# Patient Record
Sex: FEMALE | Race: WHITE
[De-identification: names, ages, dates, MRNs, and addresses within clinical notes are randomized per-mention and may not be internally consistent; named-entity substitution may affect disease eponyms.]

---

## 2017-01-04 ENCOUNTER — HOSPITAL ENCOUNTER (OUTPATIENT)
Dept: HOSPITAL 75 - PREOP | Age: 37
Discharge: HOME | End: 2017-01-04
Attending: SURGERY
Payer: COMMERCIAL

## 2017-01-04 VITALS — BODY MASS INDEX: 21.66 KG/M2 | WEIGHT: 144.56 LBS | HEIGHT: 68.5 IN

## 2017-01-04 VITALS — DIASTOLIC BLOOD PRESSURE: 91 MMHG | SYSTOLIC BLOOD PRESSURE: 143 MMHG

## 2017-01-04 DIAGNOSIS — K82.8: ICD-10-CM

## 2017-01-04 DIAGNOSIS — Z11.2: ICD-10-CM

## 2017-01-04 DIAGNOSIS — Z01.812: Primary | ICD-10-CM

## 2017-01-04 PROCEDURE — 87081 CULTURE SCREEN ONLY: CPT

## 2017-01-04 PROCEDURE — 84703 CHORIONIC GONADOTROPIN ASSAY: CPT

## 2017-01-05 ENCOUNTER — HOSPITAL ENCOUNTER (OUTPATIENT)
Dept: HOSPITAL 75 - SDC | Age: 37
Discharge: HOME | End: 2017-01-05
Attending: SURGERY
Payer: COMMERCIAL

## 2017-01-05 VITALS — SYSTOLIC BLOOD PRESSURE: 137 MMHG | DIASTOLIC BLOOD PRESSURE: 88 MMHG

## 2017-01-05 VITALS — HEIGHT: 68.5 IN | WEIGHT: 144.56 LBS | BODY MASS INDEX: 21.66 KG/M2

## 2017-01-05 VITALS — SYSTOLIC BLOOD PRESSURE: 138 MMHG | DIASTOLIC BLOOD PRESSURE: 88 MMHG

## 2017-01-05 VITALS — SYSTOLIC BLOOD PRESSURE: 141 MMHG | DIASTOLIC BLOOD PRESSURE: 88 MMHG

## 2017-01-05 VITALS — SYSTOLIC BLOOD PRESSURE: 133 MMHG | DIASTOLIC BLOOD PRESSURE: 92 MMHG

## 2017-01-05 DIAGNOSIS — K81.1: Primary | ICD-10-CM

## 2017-01-05 RX ADMIN — MORPHINE SULFATE PRN MG: 10 INJECTION, SOLUTION INTRAMUSCULAR; INTRAVENOUS at 09:52

## 2017-01-05 RX ADMIN — SODIUM CHLORIDE, SODIUM LACTATE, POTASSIUM CHLORIDE, AND CALCIUM CHLORIDE PRN MLS/HR: 600; 310; 30; 20 INJECTION, SOLUTION INTRAVENOUS at 08:11

## 2017-01-05 RX ADMIN — SODIUM CHLORIDE, SODIUM LACTATE, POTASSIUM CHLORIDE, AND CALCIUM CHLORIDE PRN MLS/HR: 600; 310; 30; 20 INJECTION, SOLUTION INTRAVENOUS at 08:55

## 2017-01-05 RX ADMIN — MORPHINE SULFATE PRN MG: 10 INJECTION, SOLUTION INTRAMUSCULAR; INTRAVENOUS at 09:59

## 2017-01-05 RX ADMIN — SODIUM CHLORIDE, SODIUM LACTATE, POTASSIUM CHLORIDE, AND CALCIUM CHLORIDE PRN MLS/HR: 600; 310; 30; 20 INJECTION, SOLUTION INTRAVENOUS at 10:11

## 2017-01-05 NOTE — DISCHARGE INST-SURGICAL
D/C Lap Instructions-JAE


Follow Up Appt in 2 weeks





Activity as tolerated


No driving for 24 hours


No driving while on pain medications





Incentive Spirometry use every 2 hours while awake





High Fiber Diet 25g or more per day





Avoid Alcohol, Caffeine, Spicy Greasy and Acid foods.





Drink 64 fluid oz or more of fluids per day.





Symptoms to Report: Fever over 101 degree F, Nausea/Vomiting 


If any problems/questions: Contact your physician or go to Emergency Room








AP ROWE MD Jan 5, 2017 9:28 am

## 2017-01-05 NOTE — PROGRESS NOTE-PRE OPERATIVE
Pre-Operative Progress Note


H&P Reviewed


The H&P was reviewed, patient examined and no changes noted.


Date H&P Reviewed:  Jan 5, 2017


Time H&P Reviewed:  08:00


Pre-Operative Diagnosis:  Biliary Dyskinesia








FRED FREIRE Jan 5, 2017 8:06 am

## 2017-01-06 NOTE — OPERATIVE REPORT
PROCEDURE PHYSICIAN:   AP DARDEN

 

DATE OF PROCEDURE:  

01/05/2017

 

ATTENDING PRIMARY CLINICIAN: 

Elizabeth Benson. 

 

PREOPERATIVE DIAGNOSIS:

Symptomatic biliary dyskinesia. 

 

POSTOPERATIVE DIAGNOSIS:

Symptomatic biliary dyskinesia.

 

PROCEDURE:

Laparoscopic cholecystectomy. 

 

SURGEON:

Dr. Darden. 

 

ASSISTANT:

LEYDA Oropeza. 

 

ANESTHESIA:

General endotracheal. 

 

ESTIMATED BLOOD LOSS:

Minimal. 

 

FINDINGS:

Distended gallbladder. No gallstones. 

 

DISPOSITION:

The patient tolerated the procedure well. 

 

Ms. Courtney Grayson is a 36-year-old female who has had issues with

epigastric and right upper abdominal pain for several months on

an intermittent basis. She reports when the pain does occur she

has radiation towards the back as well as the right shoulder. She

then reports episodes of nausea which are usually worse after

eating greasy meals. She had a CT scan performed, which did not

show any gallstones. She then had a HIDA scan done on 12/21/2016

and had severe reproduction of symptoms upon administration of

Kinevac analog. This was consistent with a symptomatic biliary

dyskinesia. 

 

PROCEDURE:

The patient was brought to the operating room, laid supine on the

table. After adequate IV pain and sedative medications and

general endotracheal intubation the abdomen was prepped and

draped in standard surgical fashion. 

 

0.5% Marcaine with epinephrine was used to anesthetize the

overlying skin in the left upper abdominal quadrant. A small

transverse skin incision made using a 15 blade. An 0 silk suture

was applied to the medial aspect of the incision for retraction.

A Veress needle inserted with a low opening pressure of 0 mmHg

and the abdomen insufflated to 15 mmHg pressure. The Veress

needle removed and a 5 mm Xcel trocar placed followed by a 5 mm,

45 degrees angle laparoscope, visualizing the peritoneal cavity.

 

 

A four-quadrant abdominal exploration was performed. There was a

distended gallbladder, the liver, omentum and stomach appeared

normal. Under direct visualization we then proceeded to place an

infraumbilical 10 mm port after the skin and peritoneum were

anesthetized using 0.5% Marcaine with epinephrine. In a similar

fashion a right upper abdominal quadrant, 5 mm port was placed. 

 

The patient was then placed in reverse Trendelenburg position as

well as planed right side up, left side down. The fundus of the

gallbladder was then retracted anteriorly and superiorly. The

hepatoduodenal ligament was then opened bluntly with a hook

instrument as well as electrocautery. The critical view of safety

was then identified dissecting out the triangle of Calot,

identified the cystic duct and artery going into the gallbladder,

as well as the dissection of the inferior portion of the

gallbladder away from the liver.  Timeout was then taken and the

cystic duct and artery were then clipped proximally and distally

and cut with Endo Jyotsna. The gallbladder was then dissected off

the liver bed using electrocautery on the hook instrument with

visualization of good hemostasis, as well as no leaking ducts of

Luschka. The gallbladder was removed through the 10 mm port site

using an Endo Catch bag. 

 

The 10 mm port site, fascia and peritoneum were then closed under

direct vision using a Joaquin-Kar device and an 0 Vicryl

suture. The abdomen was desufflated and remaining ports removed.

All skin incisions were closed using 4-0 Monocryl running

subcuticular sutures. Wounds were then cleaned and covered with

Dermabond. 

 

The patient tolerated the procedure well. We will start IV and

oral pain medication as well as a clear liquid diet. Once she is

tolerating clears, has good pain control with oral pain

medication is ambulating well, we will discharge her home. We

will have her follow-up in the office in approximately 2 weeks. 

 

 

 

 

Job ID: [id number]

Dictated Date: 01/05/2017 09:34:24 

Transcription Date: 01/06/2017 13:14:38 / jessa

## 2017-03-03 ENCOUNTER — HOSPITAL ENCOUNTER (OUTPATIENT)
Dept: HOSPITAL 75 - RAD | Age: 37
End: 2017-03-03
Attending: SURGERY
Payer: COMMERCIAL

## 2017-03-03 DIAGNOSIS — K59.09: Primary | ICD-10-CM

## 2017-03-03 PROCEDURE — 74177 CT ABD & PELVIS W/CONTRAST: CPT

## 2017-03-03 RX ADMIN — IOHEXOL ONE ML: 350 INJECTION, SOLUTION INTRAVENOUS at 13:21

## 2017-03-03 RX ADMIN — KETOROLAC TROMETHAMINE ONE ML: 30 INJECTION, SOLUTION INTRAMUSCULAR; INTRAVENOUS at 13:21

## 2017-03-03 RX ADMIN — Medication PRN ML: at 13:21

## 2017-03-03 NOTE — DIAGNOSTIC IMAGING REPORT
PROCEDURE: CT abdomen and pelvis with contrast.



TECHNIQUE: Multiple contiguous axial images were obtained

through the abdomen and pelvis after administration of

intravenous contrast.



INDICATION: Right lower quadrant pain since cholecystectomy in

January 2017. Also with nausea.



CORRELATION STUDY: 12/01/2016.



FINDINGS: Lung bases are clear. EG junction is unremarkable.



Liver demonstrates likely mild diffuse fatty infiltration

however with more focal area of infiltration along the falciform

ligament. Spleen, pancreas, and adrenal glands are unremarkable.

Gallbladder is absent with clips in the fossa. No significant

bile duct dilatation demonstrated on CT imaging. No gallbladder

fossa fluid collection. Abdominal aorta is normal in contour.

Kidneys demonstrate a cyst of the anterior left kidney measuring

16 mm. Tiny nonobstructing stone in the interpolar region of the

right kidney. Otherwise, with normal enhancement. No

obstruction.



Gastrointestinal tract demonstrates no obstruction or

inflammation. What appears to be the appendix is located in the

retrocecal location and unremarkable. Mild severity of fecal

retention. Abdominal wall appearing unremarkable without

significant hernia defect.



Urinary bladder is unremarkable. Uterus is unremarkable.

Probable small follicles of both ovaries, right greater than

left. No significant free pelvic fluid.



IMPRESSION:

1. Post cholecystectomy changes. No evidence for post

cholecystectomy complications. No acute abnormality about the

abdomen and/or pelvis.

2. Mild severity of fecal retention without evidence for large

fecal impaction or bowel obstruction.



Findings have been telephoned to Dr. Darden's office at time of

this dictation.



Dictated by:



Dictated on workstation # [id number]

## 2017-03-07 ENCOUNTER — HOSPITAL ENCOUNTER (OUTPATIENT)
Dept: HOSPITAL 75 - PREOP | Age: 37
End: 2017-03-07
Attending: SURGERY
Payer: COMMERCIAL

## 2017-03-07 DIAGNOSIS — R11.2: ICD-10-CM

## 2017-03-07 DIAGNOSIS — Z01.818: Primary | ICD-10-CM

## 2017-03-08 ENCOUNTER — HOSPITAL ENCOUNTER (OUTPATIENT)
Dept: HOSPITAL 75 - ENDO | Age: 37
Discharge: HOME | End: 2017-03-08
Attending: SURGERY
Payer: COMMERCIAL

## 2017-03-08 VITALS — WEIGHT: 144.56 LBS | BODY MASS INDEX: 21.66 KG/M2 | HEIGHT: 68.5 IN

## 2017-03-08 VITALS — DIASTOLIC BLOOD PRESSURE: 75 MMHG | SYSTOLIC BLOOD PRESSURE: 110 MMHG

## 2017-03-08 VITALS — SYSTOLIC BLOOD PRESSURE: 94 MMHG | DIASTOLIC BLOOD PRESSURE: 56 MMHG

## 2017-03-08 VITALS — DIASTOLIC BLOOD PRESSURE: 56 MMHG | SYSTOLIC BLOOD PRESSURE: 96 MMHG

## 2017-03-08 VITALS — DIASTOLIC BLOOD PRESSURE: 56 MMHG | SYSTOLIC BLOOD PRESSURE: 94 MMHG

## 2017-03-08 DIAGNOSIS — K63.5: ICD-10-CM

## 2017-03-08 DIAGNOSIS — K29.60: ICD-10-CM

## 2017-03-08 DIAGNOSIS — K21.0: Primary | ICD-10-CM

## 2017-03-08 DIAGNOSIS — K44.9: ICD-10-CM

## 2017-03-08 PROCEDURE — 88305 TISSUE EXAM BY PATHOLOGIST: CPT

## 2017-03-08 PROCEDURE — 84703 CHORIONIC GONADOTROPIN ASSAY: CPT

## 2017-03-08 RX ADMIN — FENTANYL CITRATE PRN MCG: 50 INJECTION, SOLUTION INTRAMUSCULAR; INTRAVENOUS at 13:45

## 2017-03-08 RX ADMIN — FENTANYL CITRATE PRN MCG: 50 INJECTION, SOLUTION INTRAMUSCULAR; INTRAVENOUS at 13:05

## 2017-03-08 RX ADMIN — MIDAZOLAM HYDROCHLORIDE PRN MG: 1 INJECTION, SOLUTION INTRAMUSCULAR; INTRAVENOUS at 13:40

## 2017-03-08 RX ADMIN — MIDAZOLAM HYDROCHLORIDE PRN MG: 1 INJECTION, SOLUTION INTRAMUSCULAR; INTRAVENOUS at 12:55

## 2017-03-08 RX ADMIN — MIDAZOLAM HYDROCHLORIDE PRN MG: 1 INJECTION, SOLUTION INTRAMUSCULAR; INTRAVENOUS at 13:30

## 2017-03-08 RX ADMIN — MIDAZOLAM HYDROCHLORIDE PRN MG: 1 INJECTION, SOLUTION INTRAMUSCULAR; INTRAVENOUS at 13:15

## 2017-03-08 RX ADMIN — FENTANYL CITRATE PRN MCG: 50 INJECTION, SOLUTION INTRAMUSCULAR; INTRAVENOUS at 13:25

## 2017-03-08 RX ADMIN — FENTANYL CITRATE PRN MCG: 50 INJECTION, SOLUTION INTRAMUSCULAR; INTRAVENOUS at 12:52

## 2017-03-08 RX ADMIN — MIDAZOLAM HYDROCHLORIDE PRN MG: 1 INJECTION, SOLUTION INTRAMUSCULAR; INTRAVENOUS at 12:47

## 2017-03-08 RX ADMIN — FENTANYL CITRATE PRN MCG: 50 INJECTION, SOLUTION INTRAMUSCULAR; INTRAVENOUS at 13:55

## 2017-03-08 RX ADMIN — MIDAZOLAM HYDROCHLORIDE PRN MG: 1 INJECTION, SOLUTION INTRAMUSCULAR; INTRAVENOUS at 13:50

## 2017-03-08 RX ADMIN — FENTANYL CITRATE PRN MCG: 50 INJECTION, SOLUTION INTRAMUSCULAR; INTRAVENOUS at 14:00

## 2017-03-08 NOTE — PROGRESS NOTE-PRE OPERATIVE
Pre-Operative Progress Note


H&P Reviewed


The H&P was reviewed, patient examined and no changes noted.


Date H&P Reviewed:  Mar 8, 2017


Time H&P Reviewed:  11:20


Pre-Operative Diagnosis:  persistent abd pain, change bowel habits








AP ROWE MD Mar 8, 2017 11:30 am

## 2017-03-08 NOTE — CONSCIOUS SEDATION/ASA
Conscious Sedation Pre-Proced


Time Reviewed:  11:20


ASA Class:  2











Airway Mallampati Classification: ([place] appropriate class) I.  II.  III,  IV


 


Lungs 


 


Heart 


 


 ASA score


 


 ASA 1: a normal healthy patient


 


 ASA 2:  a patient with a mild systemic disease (mid diabetes, controlled 

hypertension, obesity 


 


 ASA 3:  a patient with a severe systemic disease that limits activity  (angina

, COPD, prior Myocardial infarction)


 


 ASA 4:  a patient with an incapacitating disease that is a constant threat to 

life (CHF, renal failure)


 


 ASA 5:  a moribund patient not expected to survive 24 hrs.  (ruptured aneurysm)


 


 ASA 6:  a declared brain dead patient whose organs are being harvested.


 


 For emergent operations, add the letter E after the classification








Grade 2


Sedation Plan:  Analgesia, Amnesia, Plan communicated to team members, 

Discussed options with patient/fam, Discussed risks with patient/fam


Note


The patient is an appropriate candidate to undergo the planned procedure, 

sedation, and anesthesia.





The patient immediately re-assessed prior to indication.








AP ROWE MD Mar 8, 2017 11:29 am

## 2017-03-08 NOTE — DISCHARGE INST-SURGICAL
D/C Lap Instructions-KIDO


New, Converted, or Re-Newed RX:  RX on Chart


Follow Up Appt in 2 weeks





Activity as tolerated





High Fiber Diet 25g or more per day





Avoid Alcohol, Caffeine, Spicy Greasy and Acid foods.





Drink 64 fluid oz or more of fluids per day.





Symptoms to Report: Fever over 101 degree F, Nausea/Vomiting 


If any problems/questions: Contact your physician or go to Emergency Room








AP ROWE MD Mar 8, 2017 2:35 pm

## 2017-03-08 NOTE — PROGRESS NOTE-POST OPERATIVE
Post-Operative Progess Note


Pre-Operative Diagnosis


persistent abd pain, change bowel habits





Post-Operative Diagnosis





reflux esophagitis(class B), small HH(2cm), mild-moderate gastritis.


mild sigmoid inflammation, splenic flexure polyp.





Post-Op Procedure Note


Date of Procedure:  Mar 8, 2017


Name of Procedure:  


EGD with bx.


Colonoscopy with bx.


Anesthesia Type


CS


Estimated blood loss (mL):  minimal


Specimen(s) collected


GE jxn, antrum








AP ROWE MD Mar 8, 2017 2:33 pm

## 2017-11-02 ENCOUNTER — HOSPITAL ENCOUNTER (EMERGENCY)
Dept: HOSPITAL 75 - ER | Age: 37
Discharge: HOME | End: 2017-11-02
Payer: COMMERCIAL

## 2017-11-02 VITALS — BODY MASS INDEX: 24.55 KG/M2 | HEIGHT: 68 IN | WEIGHT: 162 LBS

## 2017-11-02 VITALS — DIASTOLIC BLOOD PRESSURE: 95 MMHG | SYSTOLIC BLOOD PRESSURE: 171 MMHG

## 2017-11-02 DIAGNOSIS — F32.9: ICD-10-CM
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
## 2017-11-02 DIAGNOSIS — Z87.891: ICD-10-CM

## 2017-11-02 DIAGNOSIS — S16.1XXA: Primary | ICD-10-CM

## 2017-11-02 DIAGNOSIS — F41.9: ICD-10-CM

## 2017-11-02 DIAGNOSIS — Z87.448: ICD-10-CM

## 2017-11-02 PROCEDURE — 84703 CHORIONIC GONADOTROPIN ASSAY: CPT

## 2017-11-02 PROCEDURE — 70450 CT HEAD/BRAIN W/O DYE: CPT

## 2017-11-02 PROCEDURE — 99282 EMERGENCY DEPT VISIT SF MDM: CPT

## 2017-11-02 PROCEDURE — 72125 CT NECK SPINE W/O DYE: CPT

## 2017-11-02 NOTE — XMS REPORT
[facility]

 Created on: 2017



Courtney Grayson

External Reference #: [id number]

: 1980

Sex: Female



Demographics







 Address  [address] [place] [address]  [address]

 

 Preferred Language  Unknown

 

 Marital Status  Unknown

 

 Mosque Affiliation  Unknown

 

 Race  Unknown

 

 Ethnic Group  Unknown





Author







 Author  HUSSEIN ARIZA

 

 Organization  [facility]

 

 Address  [address]  [place]  [address]



 

 Phone  [phone]







Care Team Providers







 Care Team Member Name  Role  Phone

 

 HUSSEIN ARIZA  Unavailable  [phone]







PROBLEMS







 Type  Condition  ICD9-CM Code  ZKE49-WQ Code  Onset Dates  Condition Status  
SNOMED Code

 

 Problem  Environmental allergies     Z91.09     Active  137668623

 

 Problem  Dysuria     R30.0     Active  16609778

 

 Problem  Family planning, BCP (birth control pills) maintenance     Z30.41     
Active  215305557

 

 Problem  Anxiety     F41.9     Active  06056753

 

 Problem  Generalized abdominal pain     R10.84     Active  712705505

 

 Problem  HTN (hypertension)     I10     Active  17361708







ALLERGIES

Unknown Allergies



SOCIAL HISTORY

No smoking Hx information available



PLAN OF CARE





VITAL SIGNS





MEDICATIONS







 Medication  Instructions  Dosage  Frequency  Start Date  End Date  Duration  
Status

 

 Tramadol HCl 50 mg  Orally every 6 hrs  1 tablet as needed  6h  08 Dec, 2016  
19 Dec, 2016  7 days  Active







RESULTS

No Results



PROCEDURES

No Known procedures



IMMUNIZATIONS

No Known Immunizations

## 2017-11-02 NOTE — XMS REPORT
[facility]

 Created on: 2017



Courtney Grayson

External Reference #: [id number]

: 1980

Sex: Female



Demographics







 Address  [address] [place] [address]  [address]

 

 Preferred Language  Unknown

 

 Marital Status  Unknown

 

 Adventist Affiliation  Unknown

 

 Race  Unknown

 

 Ethnic Group  Unknown





Author







 Author  HUSSEIN ARIZA

 

 [facility]

 

 Address  [address]  [place]  [address]



 

 Phone  [phone]







Care Team Providers







 Care Team Member Name  Role  Phone

 

 HUSSEIN ARIZA  Unavailable  [phone]







PROBLEMS







 Type  Condition  ICD9-CM Code  CVH10-XQ Code  Onset Dates  Condition Status  
SNOMED Code

 

 Problem  HTN (hypertension)     I10     Active  93128312

 

 Problem  Family planning, BCP (birth control pills) maintenance     Z30.41     
Active  947843984

 

 Problem  Anxiety     F41.9     Active  00149193







ALLERGIES

No Known Allergies



SOCIAL HISTORY

No smoking Hx information available



PLAN OF CARE





VITAL SIGNS





MEDICATIONS

No Known Medications



RESULTS

No Results



PROCEDURES

No Known procedures



IMMUNIZATIONS

No Known Immunizations

## 2017-11-02 NOTE — XMS REPORT
[facility]

 Created on: 2016



[place]  Courtney

External Reference #: [id number]

: 1980

Sex: Female



Demographics







 Address  [address] [place]  [address]

 

 Home Phone  [phone]

 

 Preferred Language  Unknown

 

 Marital Status  Unknown

 

 Mormonism Affiliation  Unknown

 

 Race  White

 

 Ethnic Group  Not  or 





Author







 HUSSEIN Alvarenga

 

 [facility]  [employer]

 

 Address  Unknown

 

 Phone  Unavailable







Care Team Providers







 Care Team Member Name  Role  Phone

 

 HUSSEIN ARIZA  CP  Unavailable



                                                                



Allergies, Adverse Reactions, Alerts

          





 Substance  Reaction  Event Type

 

 Amoxicillin  hives  Drug Allergy



                                                                               
         



Problems

          





 Problem Type  Condition  Code  Onset Dates  Condition Status

 

 Problem  Family planning, BCP (birth control pills) maintenance  Z30.41     
Active

 

 Problem  Anxiety  F41.9     Active

 

 Problem  HTN (hypertension)  I10     Active

 

 Assessment  HTN (hypertension)  I10     Active

 

 Assessment  Anxiety  F41.9     Active



                                                                               
                                                 



Medications

          





 Medication  Code System  Code  Instructions  Start Date  End Date  Status  
Dosage

 

 Cetirizine HCl  NDC  [address]-73  10 mg Orally Once a day           1 tablet 
as needed

 

 TriNessa (28)  NDC  [address]-29  0.18/0.215/0.25 mg-35 mcg (28)    2013        take 1 tablet by oral route once daily

 

 Multivitamin  NDC  [address]16      Aug 22, 2014        1 tablet by Oral route 
1 time per day

 

 Lisinopril  NDC  59348018213  10 mg             1 TABLET ONCE A DAY ORALLY 30 
DAYS

 

 BusPIRone HCl  NDC  44871315101  5 MG Orally 2 times a day           1 tablet

 

 Celexa  NDC  [address]-82  20 mg Orally Once a day  Aug 29, 2016        1 
tablet



                                                                               
                                                           



Procedures

          





 Procedure  Coding System  Code  Date

 

 Office Visit, Est Pt., Level 3  CPT-4  57711  Aug 29, 2016



                                                                               
                   



Vital Signs

          





 Date/Time:  Aug 29, 2016

 

 Cardiac Monitoring Heart Rate  90 bpm

 

 Weight  152 lbs

 

 Height  68 in

 

 BMI  23.11 Index

 

 Blood Pressure Diastolic  90 mmHg

 

 Blood Pressure Systolic  128 mmHg



                                                                              



Results

          No Known Results                                                     
               



Summary Purpose

          [employer] Submission

## 2017-11-02 NOTE — XMS REPORT
[facility]

 Created on: 2017



Courtney Grayson

External Reference #: [id number]

: 1980

Sex: Female



Demographics







 Address  [address] [place] [address]  [address]

 

 Preferred Language  Unknown

 

 Marital Status  Unknown

 

 Nondenominational Affiliation  Unknown

 

 Race  Unknown

 

 Ethnic Group  Unknown





Author







 Author  HUSSEIN ARIZA

 

 Organization  [facility]

 

 Address  [address]  [place]  [address]



 

 Phone  [phone]







Care Team Providers







 Care Team Member Name  Role  Phone

 

 HUSSEIN ARIZA  Unavailable  [phone]







PROBLEMS







 Type  Condition  ICD9-CM Code  RFK66-BJ Code  Onset Dates  Condition Status  
SNOMED Code

 

 Problem  Environmental allergies     Z91.09     Active  307315816

 

 Problem  Dysuria     R30.0     Active  38138439

 

 Problem  Family planning, BCP (birth control pills) maintenance     Z30.41     
Active  051324313

 

 Problem  Anxiety     F41.9     Active  48509619

 

 Problem  Generalized abdominal pain     R10.84     Active  237620946

 

 Problem  HTN (hypertension)     I10     Active  17284439







ALLERGIES

Unknown Allergies



SOCIAL HISTORY

No smoking Hx information available



PLAN OF CARE





VITAL SIGNS





MEDICATIONS

Unknown Medications



RESULTS

No Results



PROCEDURES

No Known procedures



IMMUNIZATIONS

No Known Immunizations

## 2017-11-02 NOTE — XMS REPORT
[facility]

 Created on: 10/13/2015



Courtney Grayson

External Reference #: [id number]

: 1980

Sex: Female



Demographics







 Address  [address] [place]  [address]

 

 Home Phone  [phone]

 

 Preferred Language  Unknown

 

 Marital Status  Unknown

 

 Caodaism Affiliation  Unknown

 

 Race  White

 

 Ethnic Group  Not  or 





Author







 ZHANE Benitez

 

 [facility]  [employer]

 

 Address  Unknown

 

 Phone  Unavailable







Care Team Providers







 Care Team Member Name  Role  Phone

 

 ZHANE DAY  CP  Unavailable



                                                                



Allergies

          No Known Allergies                                                   
                                     



Problems

          





 Problem Type  Condition  Code  Onset Dates  Condition Status

 

 Problem  Cough  786.2     Active

 

 Problem  Screening examination for pulmonary tuberculosis  V74.1     Active

 

 Problem  Acute pharyngitis  462     Active

 

 Problem  Viral warts, unspecified  078.10     Active

 

 Problem  Headache  784.0     Active

 

 Problem  Pain of right thumb  729.5     Active

 

 Problem  Need for prophylactic vaccination and inoculation, Influenza  V04.81 
    Active

 

 Problem  Unspecified conjunctivitis  372.30     Active

 

 Problem  Unspecified hypertrophic and atrophic condition of skin  701.9     
Active

 

 Problem  Postnasal drip  784.91     Active

 

 Assessment  Encounter for immunization  Z23     Active

 

 Problem  Nausea alone  787.02     Active

 

 Problem  Diarrhea  787.91     Active



                                                                               
                                                                               
                                                  



Medications

          No Known Medications                                                 
                                       



Procedures

          





 Procedure  Coding System  Code  Date

 

 SINGLE IMMUNIZATION ADMIN  CPT-4  05627  Oct 13, 2015

 

 FLUARIX QUAD (3 & UP)-GSK-  CPT-4  19961  Oct 13, 2015



                                                                               
         



Results

          No Known Results                                                     
                                   



Immunizations

          





 Vaccine  Administration Date

 

 FLUARIX QUAD (3 & UP)-GSK-2015  Oct 13, 2015



                                                                    



Summary Purpose

          [employer] Submission

## 2017-11-02 NOTE — XMS REPORT
[facility]

 Created on: 2016



Courtney Grayson

External Reference #: [id number]

: 1980

Sex: Female



Demographics







 Address  [address] [place]  [address]

 

 Home Phone  [phone]

 

 Preferred Language  Unknown

 

 Marital Status  Unknown

 

 Lutheran Affiliation  Unknown

 

 Race  White

 

 Ethnic Group  Not  or 





Author







 HUSSEIN Alvarenga

 

 [facility]  [employer]

 

 Address  Unknown

 

 Phone  Unavailable







Care Team Providers







 Care Team Member Name  Role  Phone

 

 HUSSEIN ARIZA    Unavailable



                                                                



Allergies

          No Known Allergies                                                   
                                     



Problems

          





 Problem Type  Condition  Code  Onset Dates  Condition Status

 

 Problem  Family planning, BCP (birth control pills) maintenance  Z30.41     
Active

 

 Problem  Anxiety  F41.9     Active

 

 Problem  HTN (hypertension)  I10     Active



                                                                               
                             



Medications

          





 Medication  Code System  Code  Instructions  Start Date  End Date  Status  
Dosage

 

 Pyridium  NDC  [address]-81  200 MG Orally Three times a day       1 tablet after meals



                                                                              



Results

          No Known Results                                                     
               



Summary Purpose

          [employer] Submission

## 2017-11-02 NOTE — XMS REPORT
[facility]

 Created on: 2017



Chelsea  Courtney

External Reference #: [id number]

: 1980

Sex: Female



Demographics







 Address  [address] [place]  [address]

 

 Preferred Language  Unknown

 

 Marital Status  Unknown

 

 Amish Affiliation  Unknown

 

 Race  Unknown

 

 Ethnic Group  Unknown





Author







 Author  BRYCE HERNANDEZ

 

 Organization  [facility]

 

 Address  [address] [place]  [address]



 

 Phone  [phone]







Care Team Providers







 Care Team Member Name  Role  Phone

 

 BRYCE HERNANDEZ  Unavailable  [phone]







PROBLEMS







 Type  Condition  ICD9-CM Code  AOA42-SJ Code  Onset Dates  Condition Status  
SNOMED Code

 

 Problem  HTN (hypertension)     I10     Active  31174450

 

 Problem  Family planning, BCP (birth control pills) maintenance     Z30.41     
Active  065805504

 

 Assessment  Allergic rhinitis, unspecified allergic rhinitis trigger, 
unspecified rhinitis seasonality     J30.9  09 Sep, 2016  Active  80895380

 

 Problem  Anxiety     F41.9     Active  40895022

 

 Assessment  Acute non-recurrent maxillary sinusitis     J01.00  09 Sep, 2016  
Active  38483100







ALLERGIES







 Substance  Reaction  Event Type  Date  Status

 

 Amoxicillin  hives  Drug Allergy  09 Sep, 2016  Active







SOCIAL HISTORY

No smoking Hx information available



PLAN OF CARE





VITAL SIGNS







 Height  68 in  2016

 

 Weight  151.0 lbs  2016

 

 Heart Rate  78 bpm  2016

 

 Respiratory Rate  22   2016

 

 BMI  22.96 kg/m2  2016

 

 Blood pressure systolic  100 mmHg  2016

 

 Blood pressure diastolic  72 mmHg  2016







MEDICATIONS







 Medication  Instructions  Dosage  Frequency  Start Date  End Date  Duration  
Status

 

 TriNessa (28) 0.18/0.215/0.25 mg-35 mcg (28)     take 1 tablet by oral route 
once daily             Active

 

 Multivitamin     1 tablet by Oral route 1 time per day     22 Aug, 2014        
Active

 

 Fluticasone Propionate 50 MCG/ACT  Nasally Once a day  1-2 sprays in each 
nostril  24h  09 Sep, 2016     30 day(s)  Active

 

 Cetirizine HCl 10 mg  Orally Once a day  1 tablet as needed  24h           
Active

 

 Azithromycin 250 MG  Orally Once a day  2 tablets  on the first day, then 1 
tablet daily for 4 days  24h  09 Sep, 2016  14 Sep, 2016  5 day(s)  Active

 

 Celexa 20 mg  Orally Once a day  1 tablet  24h  29 Aug, 2016     30 day(s)  
Active

 

 Lisinopril 10 mg     1 TABLET ONCE A DAY ORALLY 30 DAYS           30  Active







RESULTS

No Results



PROCEDURES







 Procedure  Date Ordered  Related Diagnosis  Body Site

 

 Office Visit, Est Pt., Level 3  2016      







IMMUNIZATIONS

No Known Immunizations

## 2017-11-02 NOTE — XMS REPORT
[facility]

 Created on: 10/14/2016



[place]  Courtney

External Reference #: [id number]

: 1980

Sex: Female



Demographics







 Address  [address] [place]  [address]

 

 Home Phone  [phone]

 

 Preferred Language  Unknown

 

 Marital Status  Unknown

 

 Worship Affiliation  Unknown

 

 Race  White

 

 Ethnic Group  Not  or 





Author







 HUSSEIN Alvarenga

 

 Organization  [employer]

 

 Address  Unknown

 

 Phone  Unavailable







Care Team Providers







 Care Team Member Name  Role  Phone

 

 HUSSEIN ARIZA  CP  Unavailable



                                                                



Allergies, Adverse Reactions, Alerts

          





 Substance  Reaction  Event Type

 

 Amoxicillin  hives  Drug Allergy



                                                                               
         



Problems

          





 Problem Type  Condition  Code  Onset Dates  Condition Status

 

 Problem  Family planning, BCP (birth control pills) maintenance  Z30.41     
Active

 

 Problem  Anxiety  F41.9     Active

 

 Problem  HTN (hypertension)  I10     Active

 

 Assessment  Anxiety  F41.9     Active

 

 Assessment  HTN (hypertension)  I10     Active



                                                                               
                                                 



Medications

          





 Medication  Code System  Code  Instructions  Start Date  End Date  Status  
Dosage

 

 Cetirizine HCl  NDC  [address]-76  10 mg Orally Once a day           1 tablet 
as needed

 

 Celexa  NDC  [address]-18  20 mg Orally Once a day           1 tablet

 

 Multivitamin  NDC  99299-94545      Aug 22, 2014        1 tablet by Oral route 
1 time per day

 

 TriNessa (28)  NDC  [address]-08  0.18/0.215/0.25 mg-35 mcg (28)    2013        take 1 tablet by oral route once daily

 

 Lisinopril  NDC  09189879834  10 mg             1 TABLET ONCE A DAY ORALLY 30 
DAYS

 

 Fluticasone Propionate  NDC  [address]-54  50 MCG/ACT Nasally Once a day  
2016        1-2 sprays in each nostril



                                                                               
                                                           



Procedures

          





 Procedure  Coding System  Code  Date

 

 Office Visit, Est Pt., Level 3  CPT-4  50997  Oct 13, 2016



                                                                               
                   



Vital Signs

          





 Date/Time:  Oct 13, 2016

 

 Cardiac Monitoring Heart Rate  88 bpm

 

 Weight  149 lbs

 

 Height  68 in

 

 BMI  22.65 Index

 

 Blood Pressure Diastolic  68 mmHg

 

 Blood Pressure Systolic  102 mmHg



                                                                              



Results

          No Known Results                                                     
               



Summary Purpose

          [employer] Submission

## 2017-11-02 NOTE — XMS REPORT
[facility]

 Created on: 2017



[place]  Courtney

External Reference #: [id number]

: 1980

Sex: Female



Demographics







 Address  [address] [place] [address]  [address]

 

 Preferred Language  Unknown

 

 Marital Status  Unknown

 

 Pentecostal Affiliation  Unknown

 

 Race  Unknown

 

 Ethnic Group  Unknown





Author







 Author  HUSSEIN ARIZA

 

 Organization  [facility]

 

 Address  [address]  [place]  [address]



 

 Phone  [phone]







Care Team Providers







 Care Team Member Name  Role  Phone

 

 HUSSEIN ARIZA  Unavailable  [phone]







PROBLEMS







 Type  Condition  ICD9-CM Code  MXC69-IC Code  Onset Dates  Condition Status  
SNOMED Code

 

 Problem  Environmental allergies     Z91.09     Active  157957330

 

 Problem  Generalized abdominal pain     R10.84     Active  111323528

 

 Problem  HTN (hypertension)     I10     Active  22475942

 

 Problem  Family planning, BCP (birth control pills) maintenance     Z30.41     
Active  565055299

 

 Problem  Dysuria     R30.0     Active  79030802

 

 Problem  Anxiety     F41.9     Active  24637837







ALLERGIES

Unknown Allergies



SOCIAL HISTORY

No smoking Hx information available



PLAN OF CARE





VITAL SIGNS





MEDICATIONS

Unknown Medications



RESULTS







 Name  Result  Date  Reference Range

 

 UA W/CULTURE IF INDICATED (IN HOUSE)         

 

 Lot #         

 

 Exp date         

 

 Clarity  turbid      

 

 Color  yellow      

 

 Odor  foul      

 

 GLU  neg      

 

 CHAIM  neg      

 

 KET  neg      

 

 SG  1.020      

 

 BLO  neg      

 

 pH  7,0      

 

 Protein  neg      

 

 URO  neg      

 

 NIT  neg      

 

 XENIA  +1      

 

 Lot #         

 

 Exp date         

 

 CULTURE, URINE     2016   

 

 Urine Culture, Routine  Final report      

 

 Result 1  No growth      







PROCEDURES







 Procedure  Date Ordered  Related Diagnosis  Body Site

 

 URINALYSIS, AUTO, W/O SCOPE  2016      

 

 URINE CULTURE/COLONY COUNT  2016      

 

 THER/PROPH/DIAG INJ, SC/IM  2016      

 

 ROCEPHIN 1 GM (IM)  2016      







IMMUNIZATIONS







 Vaccine  Route  Administration Date  Status

 

 ROCEPHIN 1 GM (IM)  IM Intramuscular  2016  Administered

## 2017-11-02 NOTE — XMS REPORT
[facility]

 Created on: 2016



Courtney Grayson

External Reference #: [id number]

: 1980

Sex: Female



Demographics







 Address  [address] [place]  [address]

 

 Home Phone  [phone]

 

 Preferred Language  Unknown

 

 Marital Status  Unknown

 

 Scientologist Affiliation  Unknown

 

 Race  White

 

 Ethnic Group  Not  or 





Author







 HUSSEIN Alvarenga

 

 Organization  [employer]

 

 Address  Unknown

 

 Phone  Unavailable







Care Team Providers







 Care Team Member Name  Role  Phone

 

 HUSSEIN ARIZA  CP  Unavailable



                                                                



Allergies

          No Known Allergies                                                   
                                     



Problems

          





 Problem Type  Condition  Code  Onset Dates  Condition Status

 

 Problem  Family planning, BCP (birth control pills) maintenance  Z30.41     
Active

 

 Problem  Anxiety  F41.9     Active

 

 Problem  HTN (hypertension)  I10     Active



                                                                               
                             



Medications

          





 Medication  Code System  Code  Instructions  Start Date  End Date  Status  
Dosage

 

 Tramadol HCl  NDC  [address]-47  50 mg Orally every 6 hrs  2016      
  1 tablet as needed



                                                                              



Results

          No Known Results                                                     
               



Summary Purpose

          [employer] Submission

## 2017-11-02 NOTE — XMS REPORT
[facility]

 Created on: 2016



Courtney Grayson

External Reference #: [id number]

: 1980

Sex: Female



Demographics







 Address  [address] [place]  [address]

 

 Home Phone  [phone]

 

 Preferred Language  Unknown

 

 Marital Status  Unknown

 

 Cheondoism Affiliation  Unknown

 

 Race  White

 

 Ethnic Group  Not  or 





Author







 HUSSEIN Alvarenga

 

 Organization  [employer]

 

 Address  Unknown

 

 Phone  Unavailable







Care Team Providers







 Care Team Member Name  Role  Phone

 

 HUSSEIN ARIZA  CP  Unavailable



                                                                



Allergies

          No Known Allergies                                                   
                                     



Problems

          





 Problem Type  Condition  Code  Onset Dates  Condition Status

 

 Problem  Family planning, BCP (birth control pills) maintenance  Z30.41     
Active

 

 Problem  Anxiety  F41.9     Active

 

 Problem  HTN (hypertension)  I10     Active



                                                                               
                             



Medications

          No Known Medications                                                 
                             



Results

          No Known Results                                                     
               



Summary Purpose

          [employer] Submission

## 2017-11-02 NOTE — XMS REPORT
[facility]

 Created on: 2016



Courtney Grayson

External Reference #: [id number]

: 1980

Sex: Female



Demographics







 Address  [address] [place]  [address]

 

 Home Phone  [phone]

 

 Preferred Language  Unknown

 

 Marital Status  Unknown

 

 Mu-ism Affiliation  Unknown

 

 Race  White

 

 Ethnic Group  Not  or 





Author







 HUSSEIN Alvarenga

 

 Organization  [employer]

 

 Address  Unknown

 

 Phone  Unavailable







Care Team Providers







 Care Team Member Name  Role  Phone

 

 HUSSEIN ARIZA  CP  Unavailable



                                                                



Allergies

          No Known Allergies                                                   
                                     



Problems

          





 Problem Type  Condition  Code  Onset Dates  Condition Status

 

 Problem  Family planning, BCP (birth control pills) maintenance  Z30.41     
Active

 

 Problem  Anxiety  F41.9     Active

 

 Problem  HTN (hypertension)  I10     Active



                                                                               
                             



Medications

          No Known Medications                                                 
                             



Results

          No Known Results                                                     
               



Summary Purpose

          [employer] Submission

## 2017-11-02 NOTE — XMS REPORT
[facility]

 Created on: 2016



Courtney Grayson

External Reference #: [id number]

: 1980

Sex: Female



Demographics







 Address  [address] [place]  [address]

 

 Home Phone  [phone]

 

 Preferred Language  Unknown

 

 Marital Status  Unknown

 

 Protestant Affiliation  Unknown

 

 Race  White

 

 Ethnic Group  Not  or 





Author







 HUSSEIN Alvarenga

 

 [facility]  [employer]

 

 Address  Unknown

 

 Phone  Unavailable







Care Team Providers







 Care Team Member Name  Role  Phone

 

 HUSSEIN ARIZA    Unavailable



                                                                



Allergies

          No Known Allergies                                                   
                                     



Problems

          





 Problem Type  Condition  Code  Onset Dates  Condition Status

 

 Problem  Family planning, BCP (birth control pills) maintenance  Z30.41     
Active

 

 Problem  Anxiety  F41.9     Active

 

 Problem  HTN (hypertension)  I10     Active



                                                                               
                             



Medications

          





 Medication  Code System  Code  Instructions  Start Date  End Date  Status  
Dosage

 

 Flagyl  NDC  [address]-74  500 MG Orally 2 times a day       1 tablet



                                                                              



Results

          No Known Results                                                     
               



Summary Purpose

          [employer] Submission

## 2017-11-02 NOTE — XMS REPORT
[facility]

 Created on: 2017



Courtney Grayson

External Reference #: [id number]

: 1980

Sex: Female



Demographics







 Address  [address] [place] [address]  [address]

 

 Preferred Language  Unknown

 

 Marital Status  Unknown

 

 Christianity Affiliation  Unknown

 

 Race  Unknown

 

 Ethnic Group  Unknown





Author







 Author  HUSSEIN ARIZA

 

 Organization  [facility]

 

 Address  [address]  [place]  [address]



 

 Phone  [phone]







Care Team Providers







 Care Team Member Name  Role  Phone

 

 NIKOLAI ARIZAE  Unavailable  [phone]







PROBLEMS







 Type  Condition  ICD9-CM Code  SYQ39-YS Code  Onset Dates  Condition Status  
SNOMED Code

 

 Problem  HTN (hypertension)     I10     Active  86873787

 

 Problem  Family planning, BCP (birth control pills) maintenance     Z30.41     
Active  686724546

 

 Problem  Anxiety     F41.9     Active  45220097

 

 Assessment  Dysuria     R30.0  01 Dec, 2016  Active  13699187







ALLERGIES







 Substance  Reaction  Event Type  Date  Status

 

 Amoxicillin  hives  Drug Allergy  01 Dec, 2016  Active







SOCIAL HISTORY

No smoking Hx information available



PLAN OF CARE





VITAL SIGNS







 Height  68 in  2016

 

 Weight  145.1 lbs  2016

 

 Heart Rate  92 bpm  2016

 

 Respiratory Rate  20   2016

 

 BMI  22.06 kg/m2  2016

 

 Blood pressure systolic  125 mmHg  2016

 

 Blood pressure diastolic  85 mmHg  2016







MEDICATIONS







 Medication  Instructions  Dosage  Frequency  Start Date  End Date  Duration  
Status

 

 Lisinopril 10 mg     1 TABLET ONCE A DAY ORALLY 30 DAYS           30  Active

 

 Celexa 20 mg  Orally Once a day  1 tablet  24h        30  Active

 

 Multivitamin     1 tablet by Oral route 1 time per day     22 Aug, 2014        
Active

 

 Cetirizine HCl 10 mg  Orally Once a day  1 tablet as needed  24h           
Active

 

 Fluticasone Propionate 50 MCG/ACT  Nasally Once a day  1-2 sprays in each 
nostril  24h  09 Sep, 2016     30 day(s)  Active

 

 Tramadol HCl 50 mg  Orally every 6 hrs  1 tablet as needed  6h    
      Active

 

 TriNessa (28) 0.18/0.215/0.25 mg-35 mcg (28)     take 1 tablet by oral route 
once daily             Active







RESULTS







 Name  Result  Date  Reference Range

 

 UA LONG DIP (IN HOUSE)     2016   

 

 Lot #  [id number]      

 

 Exp date        

 

 Clarity  Slightly Cloudy      

 

 Color  Yellow      

 

 Odor  Yes      

 

 GLU  Negative      

 

 CHAIM  Negative      

 

 KET  Negative      

 

 SG  >=1.030      

 

 BLO  Negative      

 

 pH  6.0      

 

 Protein  Negative      

 

 URO  0.2      

 

 NIT  Negative      

 

 XENIA  Negative      

 

 Lot #  [id number]      

 

 Exp date        







PROCEDURES







 Procedure  Date Ordered  Related Diagnosis  Body Site

 

 URINALYSIS, AUTO, W/O SCOPE  Dec 01, 2016      

 

 Office Visit, Est Pt., Level 3  Dec 01, 2016      







IMMUNIZATIONS

No Known Immunizations

## 2017-11-02 NOTE — XMS REPORT
[facility]

 Created on: 2016



Courtney Grayson

External Reference #: [id number]

: 1980

Sex: Female



Demographics







 Address  [address] [place]  [address]

 

 Home Phone  [phone]

 

 Preferred Language  Unknown

 

 Marital Status  Unknown

 

 Worship Affiliation  Unknown

 

 Race  White

 

 Ethnic Group  Not  or 





Author







 HUSSEIN Alvarenga

 

 [facility]  [employer]

 

 Address  Unknown

 

 Phone  Unavailable







Care Team Providers







 Care Team Member Name  Role  Phone

 

 HUSSEIN ARIZA  CP  Unavailable



                                                                



Allergies, Adverse Reactions, Alerts

          





 Substance  Reaction  Event Type

 

 Amoxicillin  hives  Drug Allergy



                                                                               
         



Problems

          





 Problem Type  Condition  Code  Onset Dates  Condition Status

 

 Problem  Family planning, BCP (birth control pills) maintenance  Z30.41     
Active

 

 Problem  Anxiety  F41.9     Active

 

 Problem  HTN (hypertension)  I10     Active

 

 Assessment  Pelvic pain  R10.2     Active

 

 Assessment  Candidal skin infection  B37.2     Active



                                                                               
                                                 



Medications

          





 Medication  Code System  Code  Instructions  Start Date  End Date  Status  
Dosage

 

 Diflucan  NDC  [address]-40  100 MG Orally Once a day  2016  Dec 02, 
2016     1 tablet

 

 TriNessa (28)  NDC  [address]-69  0.18/0.215/0.25 mg-35 mcg (28)    2013        take 1 tablet by oral route once daily

 

 Celexa  NDC  [address]-11  20 mg Orally Once a day           1 tablet

 

 Lisinopril  NDC  83490398413  10 mg             1 TABLET ONCE A DAY ORALLY 30 
DAYS

 

 Multivitamin  NDC  36044-89672      Aug 22, 2014        1 tablet by Oral route 
1 time per day

 

 Cetirizine HCl  NDC  [address]-25  10 mg Orally Once a day           1 tablet 
as needed

 

 Tramadol HCl  NDC  [address]-12  50 mg Orally every 6 hrs  2016      
  1 tablet as needed

 

 Fluticasone Propionate  NDC  [address]-15  50 MCG/ACT Nasally Once a day  
2016        1-2 sprays in each nostril



                                                                               
                                                                               



Procedures

          





 Procedure  Coding System  Code  Date

 

 URINE PREGNANCY TEST  CPT-4  08962  2016

 

 No Charge  CPT-4  20104  2016

 

 URINALYSIS, AUTO, W/O SCOPE  CPT-4  91468  2016

 

 GARCIA VAG, DNA, DIR PROBE  CPT-4  94543  2016

 

 TRICHOMONAS ASSAY W/OPTIC  CPT-4  51684  2016

 

 Office Visit, Est Pt., Level 3  CPT-4  12975  2016

 

 CULTURE, BACTERIA, OTHER  CPT-4  22659  2016



                                                                               
                                                                               



Vital Signs

          





 Date/Time:  2016

 

 Cardiac Monitoring Heart Rate  84 bpm

 

 Weight  147.4 lbs

 

 Height  68 in

 

 BMI  22.41 Index

 

 Blood Pressure Diastolic  78 mmHg

 

 Blood Pressure Systolic  124 mmHg



                                                                    



Results

          





 Name  Result  Date  Reference Range  Unit  Abnormality Flag

 

 UA LONG DIP (IN HOUSE)               

 

 ----URO  0.2  2016         

 

 ----Protein  negative  2016         

 

 ----Odor  none  2016         

 

 ----GLU  negative  2016         

 

 ----CHAIM  negative  2016         

 

 ----KET  negative  2016         

 

 ----SG  1.025  2016         

 

 ----BLO  negative  2016         

 

 ----Clarity  slightly cloudy  2016         

 

 ----pH  6.0  2016         

 

 ----XENIA  negative  2016         

 

 ----Color  yellow  2016         

 

 ----NIT  negative  2016         

 

 BACTERIAL VAGINOSIS (IN HOUSE)               

 

 ----Exp date  2016         

 

 ----RESULTS  negative  2016         

 

 ----Lot #  [id number]  2016         

 

 ----Control  +  2016         

 

 PREGNANCY TEST, URINE (IN HOUSE)               

 

 ----Lot #  [id number]  2016         

 

 ----Control  +  2016         

 

 ----Exp date  2016         

 

 TRICHOMONAS (IN HOUSE)               

 

 ----Exp date  2016         

 

 ----Control  +  2016         

 

 ----Lot #  [id number]  2016         

 

 ----TRICHOMONAS  negative  2016         

 

 Ultrasound : Pelvic, COMPLETE (REFLEX CPT-05095)               



                                                                               
                             



Summary Purpose

          [employer]ClinicalWorks Submission

## 2017-11-02 NOTE — XMS REPORT
[facility]

 Created on: 2017



[place]ney juarez

External Reference #: [id number]

: 1980

Sex: Female



Demographics







 Address  [address] [place]  [address]

 

 Preferred Language  Unknown

 

 Marital Status  Unknown

 

 Orthodox Affiliation  Unknown

 

 Race  Unknown

 

 Ethnic Group  Unknown





Author







 Author  BRYCE HERNANDEZ

 

 Organization  [facility] [place] [facility]

 

 Address  [address] [place]  [address]



 

 Phone  [phone]







Care Team Providers







 Care Team Member Name  Role  Phone

 

 BRYCE HERNANDEZ  Unavailable  [phone]







PROBLEMS







 Type  Condition  ICD9-CM Code  ETE65-DF Code  Onset Dates  Condition Status  
SNOMED Code

 

 Problem  HTN (hypertension)     I10     Active  81681670

 

 Problem  Family planning, BCP (birth control pills) maintenance     Z30.41     
Active  286279252

 

 Assessment  Lower abdominal pain     R10.30  08 Dec, 2016  Active  12869068

 

 Problem  Anxiety     F41.9     Active  52297539

 

 Assessment  Dysuria     R30.0  08 Dec, 2016  Active  70670220







ALLERGIES







 Substance  Reaction  Event Type  Date  Status

 

 Amoxicillin  hives  Drug Allergy  08 Dec, 2016  Active







SOCIAL HISTORY

No smoking Hx information available



PLAN OF CARE





VITAL SIGNS







 Height  68 in  2016

 

 Weight  144.2 lbs  2016

 

 Heart Rate  88 bpm  2016

 

 Respiratory Rate  20   2016

 

 BMI  21.92 kg/m2  2016

 

 Blood pressure systolic  126 mmHg  2016

 

 Blood pressure diastolic  90 mmHg  2016







MEDICATIONS







 Medication  Instructions  Dosage  Frequency  Start Date  End Date  Duration  
Status

 

 Cetirizine HCl 10 mg  Orally Once a day  1 tablet as needed  24h           
Active

 

 TriNessa (28) 0.18/0.215/0.25 mg-35 mcg (28)     take 1 tablet by oral route 
once daily             Active

 

 Multivitamin     1 tablet by Oral route 1 time per day     22 Aug, 2014        
Active

 

 Naproxen 500 MG  Orally every 12 hrs  1 tablet as needed  12h  08 Dec, 2016  
15 Dec, 2016  7 days  Active

 

 Celexa 20 mg  Orally Once a day  1 tablet  24h        30  Active

 

 Lisinopril 10 mg     1 TABLET ONCE A DAY ORALLY 30 DAYS           30  Active

 

 Zofran 4 MG  Orally every 8 hours  1 tablet  8h  08 Dec, 2016     7 days  
Active

 

 Tramadol HCl 50 MG  Orally every 6 hrs  1 tablet as needed  6h  08 Dec, 2016  
15 Dec, 2016  7 days  Active







RESULTS







 Name  Result  Date  Reference Range

 

 CBC     2016   

 

 WBC  7.3     3.4-10.8

 

 RBC  4.59     3.77-5.28

 

 Hemoglobin  13.3     11.1-15.9

 

 Hematocrit  39.8     34.0-46.6

 

 MCV  87     79-97

 

 MCH  29.0     26.6-33.0

 

 MCHC  33.4     31.5-35.7

 

 RDW  12.4     12.3-15.4

 

 Platelets  389     150-379

 

 Neutrophils  75      

 

 Lymphs  18      

 

 Monocytes  5      

 

 Eos  1      

 

 Basos  1      

 

 Neutrophils (Absolute)  5.6     1.4-7.0

 

 Lymphs (Absolute)  1.3     0.7-3.1

 

 Monocytes(Absolute)  0.4     0.1-0.9

 

 Eos (Absolute)  0.1     0.0-0.4

 

 Baso (Absolute)  0.0     0.0-0.2

 

 Immature Granulocytes  0      

 

 Immature Grans (Abs)  0.0     0.0-0.1

 

 CMP     2016   

 

 Glucose, Serum  89     65-99

 

 BUN  11     6-20

 

 Creatinine, Serum  0.76     0.57-1.00

 

 eGFR If NonAfricn Am  103         >59

 

 eGFR If Africn Am  118         >59

 

 BUN/Creatinine Ratio  14     8-20

 

 Sodium, Serum  141     136-144

 

 Potassium, Serum  4.4     3.5-5.2

 

 Chloride, Serum  102     

 

 Carbon Dioxide, Total  25     18-29

 

 Calcium, Serum  9.7     8.7-10.2

 

 Protein, Total, Serum  7.2     6.0-8.5

 

 Albumin, Serum  4.6     3.5-5.5

 

 Globulin, Total  2.6     1.5-4.5

 

 A/G Ratio  1.8     1.1-2.5

 

 Bilirubin, Total  0.3     0.0-1.2

 

 Alkaline Phosphatase, S  79     

 

 AST (SGOT)  21     0-40

 

 ALT (SGPT)  20     0-32

 

 UA LONG DIP (IN HOUSE)     2016   

 

 Lot #  [id number]      

 

 Exp date  2018      

 

 Clarity  clear      

 

 Color  dark yellow      

 

 Odor  none      

 

 GLU  negative      

 

 CHAIM  negative      

 

 KET  negative      

 

 SG  >-1.030      

 

 BLO  2+      

 

 pH  6.5      

 

 Protein  trace      

 

 URO  0.2      

 

 NIT  negative      

 

 XENIA  negative      

 

 Lot #  [id number]      

 

 Exp date  2018      

 

 CULTURE, URINE     2016   

 

 Urine Culture, Routine  Final report      

 

 Result 1  No growth      







PROCEDURES







 Procedure  Date Ordered  Related Diagnosis  Body Site

 

 URINALYSIS, AUTO, W/O SCOPE  Dec 08, 2016      

 

 Office Visit, Est Pt., Level 3  Dec 08, 2016      

 

 COMPREHEN METABOLIC PANEL  Dec 08, 2016      

 

 COMPLETE CBC W/AUTO DIFF WBC  Dec 08, 2016      

 

 URINE CULTURE/COLONY COUNT  Dec 08, 2016      

 

 VENIPUNCT, ROUTINE*  Dec 08, 2016      







IMMUNIZATIONS

No Known Immunizations

## 2017-11-02 NOTE — XMS REPORT
[facility]

 Created on: 2017



LoveladyCourtney juarez

External Reference #: [id number]

: 1980

Sex: Female



Demographics







 Address  [address] [place] [address]  [address]

 

 Preferred Language  Unknown

 

 Marital Status  Unknown

 

 Yarsanism Affiliation  Unknown

 

 Race  Unknown

 

 Ethnic Group  Unknown





Author







 Author  HUSSEIN ARIZA

 

 Organization  [facility]

 

 Address  [address]  [place]  [address]



 

 Phone  [phone]







Care Team Providers







 Care Team Member Name  Role  Phone

 

 TO  HUSSEIN  Unavailable  [phone]







PROBLEMS







 Type  Condition  ICD9-CM Code  WAM40-LB Code  Onset Dates  Condition Status  
SNOMED Code

 

 Problem  Environmental allergies     Z91.09     Active  666902962

 

 Problem  Generalized abdominal pain     R10.84     Active  322839036

 

 Problem  HTN (hypertension)     I10     Active  19023669

 

 Problem  Family planning, BCP (birth control pills) maintenance     Z30.41     
Active  786158721

 

 Problem  Dysuria     R30.0     Active  57554885

 

 Problem  Anxiety     F41.9     Active  54367529







ALLERGIES







 Substance  Reaction  Event Type  Date  Status

 

 Amoxicillin  hives  Drug Allergy  02 Mar, 2017  Active







SOCIAL HISTORY

Never Assessed



PLAN OF CARE







 Activity  Details









  









 Follow Up  3 Months Reason:htn







VITAL SIGNS







 Height  68 in  2017

 

 Weight  149.6 lbs  2017

 

 Temperature  98.1 degrees Fahrenheit  2017

 

 Heart Rate  80 bpm  2017

 

 Respiratory Rate  20   2017

 

 BMI  22.74 kg/m2  2017

 

 Blood pressure systolic  114 mmHg  2017

 

 Blood pressure diastolic  70 mmHg  2017







MEDICATIONS







 Medication  Instructions  Dosage  Frequency  Start Date  End Date  Duration  
Status

 

 Cetirizine HCl 10 mg     1 TABLET AS NEEDED ONCE A DAY ORALLY              
Active

 

 Zofran 4 MG  Orally every 8 hours  1 tablet  8h  08 Dec, 2016        Active

 

 TriNessa (28) 0.18/0.215/0.25 mg-35 mcg (28)     take 1 tablet by oral route 
once daily             Active

 

 Lisinopril 10 mg  Orally Once a day  1 TABLET ONCE A DAY ORALLY 30 DAYS  24h  
      30  Active

 

 Celexa 20 mg     1 TABLET ONCE A DAY ORALLY 30              Active

 

 Carafate 1 GM  Orally Twice a day  1 tablet on an empty stomach  12h           
Active

 

 Pantoprazole Sodium 40 MG  Orally Once a day  1 tablet  24h  02 Mar, 2017     
   Active

 

 Multivitamin     1 tablet by Oral route 1 time per day     22 Aug, 2014        
Active

 

 Hydrocodone-Acetaminophen 7.5-325 MG  Orally every 6 hrs  1 tablet as needed  
6h           Active







RESULTS







 Name  Result  Date  Reference Range

 

 UA LONG DIP (IN HOUSE)     2017   

 

 Lot #  [id number]      

 

 Exp date  2018      

 

 Clarity  clear      

 

 Color  yellow      

 

 Odor  none      

 

 GLU  negative      

 

 CHAIM  negative      

 

 KET  negative      

 

 SG  1.020      

 

 BLO  3+      

 

 pH  7.0      

 

 Protein  negative      

 

 URO  0.2      

 

 NIT  negative      

 

 XENIA  negative      

 

 Lot #         

 

 Exp date         







PROCEDURES







 Procedure  Date Ordered  Result  Body Site

 

 URINALYSIS, AUTO, W/O SCOPE  2017      







IMMUNIZATIONS

No Known Immunizations



MEDICAL (GENERAL) HISTORY







 Type  Description  Date

 

 Medical History  Anxiety   

 

 Medical History  hypertension   

 

 Medical History  gastroenteritis   

 

 Surgical History  wisdom teeth extraction   

 

 Surgical History  cholecsytectomy  2017

 

 Surgical History  Colonoscopy, EGD  2017

 

 Hospitalization History  child birth

## 2017-11-02 NOTE — XMS REPORT
[facility]

 Created on: 2016



MontereyCourtney juarez

External Reference #: [id number]

: 1980

Sex: Female



Demographics







 Address  [address] [place]  [address]

 

 Home Phone  [phone]

 

 Preferred Language  Unknown

 

 Marital Status  Unknown

 

 Alevism Affiliation  Unknown

 

 Race  White

 

 Ethnic Group  Not  or 





Author







 HUSSEIN Alvarenga

 

 [facility]  [employer]

 

 Address  Unknown

 

 Phone  Unavailable







Care Team Providers







 Care Team Member Name  Role  Phone

 

 HUSSEIN ARIZA  CP  Unavailable



                                                                



Allergies, Adverse Reactions, Alerts

          





 Substance  Reaction  Event Type

 

 Amoxicillin  hives  Drug Allergy



                                                                               
         



Problems

          





 Problem Type  Condition  Code  Onset Dates  Condition Status

 

 Problem  Family planning, BCP (birth control pills) maintenance  Z30.41     
Active

 

 Problem  Anxiety  F41.9     Active

 

 Problem  HTN (hypertension)  I10     Active

 

 Assessment  Environmental allergies  Z91.09     Active

 

 Assessment  Anxiety  F41.9     Active

 

 Assessment  HTN (hypertension)  I10     Active



                                                                               
                                                           



Medications

          





 Medication  Code System  Code  Instructions  Start Date  End Date  Status  
Dosage

 

 TriNessa (28)  NDC  [address]-21  0.18/0.215/0.25 mg-35 mcg (28)    2013        take 1 tablet by oral route once daily

 

 Multivitamin  NDC  [address]      Aug 22, 2014        1 tablet by Oral route 
1 time per day

 

 Lisinopril  NDC  [address]-10  10 mg Orally Once a day  2016        
1 tablet

 

 BusPIRone HCl  NDC  [address]-69  5 MG Orally 2 times a day  May 02, 2016    
    1 tablet

 

 Cetirizine HCl  NDC  [address]-17  10 mg Orally Once a day           1 tablet 
as needed



                                                                               
                                                 



Procedures

          





 Procedure  Coding System  Code  Date

 

 Office Visit, Est Pt., Level 4  CPT-4  61596  May 02, 2016



                                                                               
                   



Vital Signs

          





 Date/Time:  May 02, 2016

 

 Temperature  98.1 F

 

 Weight  158.0 lbs

 

 Height  68 in

 

 BMI  24.02 Index

 

 Blood Pressure Diastolic  72 mmHg

 

 Blood Pressure Systolic  124 mmHg

 

 Cardiac Monitoring Heart Rate  72 bpm



                                                                              



Results

          No Known Results                                                     
               



Summary Purpose

          eClinical[employer] Submission
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
## 2017-11-02 NOTE — XMS REPORT
[facility]

 Created on: 2016



Courtney Grayson

External Reference #: [id number]

: 1980

Sex: Female



Demographics







 Address  [address] [place]  [address]

 

 Home Phone  [phone]

 

 Preferred Language  Unknown

 

 Marital Status  Unknown

 

 Sikhism Affiliation  Unknown

 

 Race  White

 

 Ethnic Group  Not  or 





Author







 HUSSEIN Alvarenga

 

 Organization  [employer]

 

 Address  Unknown

 

 Phone  Unavailable







Care Team Providers







 Care Team Member Name  Role  Phone

 

 HUSSEIN ARIZA  CP  Unavailable



                                                                



Allergies

          No Known Allergies                                                   
                                     



Problems

          





 Problem Type  Condition  Code  Onset Dates  Condition Status

 

 Problem  Family planning, BCP (birth control pills) maintenance  Z30.41     
Active

 

 Problem  Anxiety  F41.9     Active

 

 Problem  HTN (hypertension)  I10     Active



                                                                               
                             



Medications

          No Known Medications                                                 
                             



Results

          No Known Results                                                     
               



Summary Purpose

          eClinicalWorks Submission

## 2017-11-02 NOTE — XMS REPORT
[facility]

 Created on: 10/05/2015



Courtney Grayson

External Reference #: [id number]

: 1980

Sex: Female



Demographics







 Address  [address] [place]  [address]

 

 Home Phone  [phone]

 

 Preferred Language  Unknown

 

 Marital Status  Unknown

 

 Muslim Affiliation  Unknown

 

 Race  White

 

 Ethnic Group  Not  or 





Author







 Author  HUSSEIN ARIZA

 

 [facility]  [employer]

 

 Address  Unknown

 

 Phone  Unavailable







Care Team Providers







 Care Team Member Name  Role  Phone

 

 HUSSEIN ARIZA  CP  Unavailable



                                                                



Allergies, Adverse Reactions, Alerts

          





 Substance  Reaction  Event Type

 

 Amoxicillin  Info Not Available  Drug Allergy



                                                                               
         



Problems

          





 Problem Type  Condition  Code  Onset Dates  Condition Status

 

 Problem  Cough  786.2     Active

 

 Problem  Screening examination for pulmonary tuberculosis  V74.1     Active

 

 Problem  Acute pharyngitis  462     Active

 

 Problem  Viral warts, unspecified  078.10     Active

 

 Problem  Headache  784.0     Active

 

 Problem  Pain of right thumb  729.5     Active

 

 Problem  Need for prophylactic vaccination and inoculation, Influenza  V04.81 
    Active

 

 Problem  Unspecified conjunctivitis  372.30     Active

 

 Problem  Unspecified hypertrophic and atrophic condition of skin  701.9     
Active

 

 Problem  Postnasal drip  784.91     Active

 

 Assessment  Pain of right thumb  729.5     Active

 

 Problem  Nausea alone  787.02     Active

 

 Problem  Diarrhea  787.91     Active



                                                                               
                                                                               
                                                  



Medications

          





 Medication  Code System  Code  Instructions  Start Date  End Date  Status  
Dosage

 

 Ibuprofen  NDC  [address]-00  200 MG Orally every 6 hrs  Aug 08, 2015  Aug 15
, 2015     4 tablet as needed

 

 TriNessa (28)  NDC  [address]-57  0.18/0.215/0.25 mg-35 mcg (28)    2013        take 1 tablet by oral route once daily

 

 Multivitamin  NDC  [address]46[address]      Aug 22, 2014        1 tablet by Oral route 
1 time per day



                                                                               
                             



Procedures

          





 Procedure  Coding System  Code  Date

 

 Office Visit, Est Pt., Level 3  CPT-4  93048  Aug 08, 2015



                                                                               
                   



Vital Signs

          





 Date/Time:  Aug 08, 2015

 

 Temperature  98.3 F

 

 Weight  155.3 lbs

 

 Height  68 in

 

 BMI  23.61 Index

 

 Blood Pressure Diastolic  76 mmHg

 

 Blood Pressure Systolic  138 mmHg

 

 Cardiac Monitoring Heart Rate  76 bpm



                                                                              



Results

          No Known Results                                                     
               



Summary Purpose

          eClinicalWorks Submission

## 2017-11-02 NOTE — XMS REPORT
[facility]

 Created on: 2016



DanvilleCourtney

External Reference #: [id number]

: 1980

Sex: Female



Demographics







 Address  [address] [place]  [address]

 

 Home Phone  [phone]

 

 Preferred Language  Unknown

 

 Marital Status  Unknown

 

 Yarsanism Affiliation  Unknown

 

 Race  White

 

 Ethnic Group  Not  or 





Author







 Author  HUSSEIN ARIZA

 

 [facility]  [employer]

 

 Address  Unknown

 

 Phone  Unavailable







Care Team Providers







 Care Team Member Name  Role  Phone

 

 HUSSEIN ARIZA    Unavailable



                                                                



Allergies

          No Known Allergies                                                   
                                     



Problems

          





 Problem Type  Condition  Code  Onset Dates  Condition Status

 

 Problem  Cough  786.2     Active

 

 Problem  Screening examination for pulmonary tuberculosis  V74.1     Active

 

 Problem  Acute pharyngitis  462     Active

 

 Problem  Nausea alone  787.02     Active

 

 Problem  Diarrhea  787.91     Active

 

 Problem  Viral warts, unspecified  078.10     Active

 

 Problem  Headache  784.0     Active

 

 Problem  Pain of right thumb  729.5     Active

 

 Problem  Need for prophylactic vaccination and inoculation, Influenza  V04.81 
    Active

 

 Problem  Unspecified conjunctivitis  372.30     Active

 

 Problem  Unspecified hypertrophic and atrophic condition of skin  701.9     
Active

 

 Problem  Postnasal drip  784.91     Active



                                                                               
                                                                               
                                        



Medications

          





 Medication  Code System  Code  Instructions  Start Date  End Date  Status  
Dosage

 

 Lisinopril  NDC  [address]-13  10 mg Orally Once a day  2016        
1 tablet



                                                                              



Results

          No Known Results                                                     
               



Summary Purpose

          [employer]ClinicalWorks Submission

## 2017-11-02 NOTE — XMS REPORT
[facility]

 Created on: 2016



Courtney Grayson

External Reference #: [id number]

: 1980

Sex: Female



Demographics







 Address  [address] [place]  [address]

 

 Home Phone  [phone]

 

 Preferred Language  Unknown

 

 Marital Status  Unknown

 

 [employer] Affiliation  Unknown

 

 Race  White

 

 Ethnic Group  Not  or 





Author







 Author  ALANNAH CASPER

 

 Organization  [employer]

 

 Address  Unknown

 

 Phone  Unavailable







Care Team Providers







 Care Team Member Name  Role  Phone

 

 ALANNAH CASPER  CP  Unavailable



                                                                



Allergies, Adverse Reactions, Alerts

          





 Substance  Reaction  Event Type

 

 Amoxicillin  Info Not Available  Drug Allergy



                                                                               
         



Problems

          





 Problem Type  Condition  Code  Onset Dates  Condition Status

 

 Problem  Cough  786.2     Active

 

 Problem  Screening examination for pulmonary tuberculosis  V74.1     Active

 

 Problem  Acute pharyngitis  462     Active

 

 Problem  Viral warts, unspecified  078.10     Active

 

 Problem  Headache  784.0     Active

 

 Problem  Pain of right thumb  729.5     Active

 

 Problem  Need for prophylactic vaccination and inoculation, Influenza  V04.81 
    Active

 

 Problem  Unspecified conjunctivitis  372.30     Active

 

 Problem  Unspecified hypertrophic and atrophic condition of skin  701.9     
Active

 

 Problem  Postnasal drip  784.91     Active

 

 Assessment  Acute anxiety  F41.9     Active

 

 Assessment  Elevated blood pressure (not hypertension)  R03.0     Active

 

 Problem  Nausea alone  787.02     Active

 

 Problem  Diarrhea  787.91     Active



                                                                               
                                                                               
                                                            



Medications

          





 Medication  Code System  Code  Instructions  Start Date  End Date  Status  
Dosage

 

 Lisinopril  NDC  [address]-95  10 mg Orally Once a day  2016        
1 tablet

 

 TriNessa (28)  NDC  [address]-66  0.18/0.215/0.25 mg-35 mcg (28)    2013        take 1 tablet by oral route once daily

 

 Multivitamin  NDC  63105-60246      Aug 22, 2014        1 tablet by Oral route 
1 time per day



                                                                               
                             



Procedures

          





 Procedure  Coding System  Code  Date

 

 Office Visit, Est Pt., Level 3  CPT-4  83686  2016



                                                                               
                   



Vital Signs

          





 Date/Time:  2016

 

 Temperature  98.5 F

 

 Weight  159 lbs

 

 Height  68 in

 

 BMI  24.17 Index

 

 Blood Pressure Diastolic  96 mmHg

 

 Blood Pressure Systolic  152 mmHg

 

 Cardiac Monitoring Heart Rate  88 bpm



                                                                              



Results

          No Known Results                                                     
               



Summary Purpose

          [employer] Submission

## 2017-11-02 NOTE — XMS REPORT
[facility]

 Created on: 2017



Courtney Grayson

External Reference #: [id number]

: 1980

Sex: Female



Demographics







 Address  [address] [place] [address]  [address]

 

 Preferred Language  Unknown

 

 Marital Status  Unknown

 

 [employer] Affiliation  Unknown

 

 Race  Unknown

 

 Ethnic Group  Unknown





Author







 Author  HUSSEIN ARIZA

 

 Organization  [facility]

 

 Address  [address]  [place]  [address]



 

 Phone  [phone]







Care Team Providers







 Care Team Member Name  Role  Phone

 

 HUSSEIN ARIZA  Unavailable  [phone]







PROBLEMS







 Type  Condition  ICD9-CM Code  UOM91-EF Code  Onset Dates  Condition Status  
SNOMED Code

 

 Problem  Environmental allergies     Z91.09     Active  155899990

 

 Problem  Generalized abdominal pain     R10.84     Active  728679829

 

 Problem  HTN (hypertension)     I10     Active  80494285

 

 Problem  Family planning, BCP (birth control pills) maintenance     Z30.41     
Active  697194208

 

 Problem  Dysuria     R30.0     Active  47205598

 

 Problem  Anxiety     F41.9     Active  35244142







ALLERGIES

No Known Allergies



SOCIAL HISTORY

No smoking Hx information available



PLAN OF CARE





VITAL SIGNS





MEDICATIONS







 Medication  Instructions  Dosage  Frequency  Start Date  End Date  Duration  
Status

 

 Celexa 20 mg  Orally Once a day  1 tablet  24h        30  Active







RESULTS

No Results



PROCEDURES

No Known procedures



IMMUNIZATIONS

No Known Immunizations
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
## 2017-11-02 NOTE — XMS REPORT
[facility]

 Created on: 07/15/2016



Courtney Grayson

External Reference #: [id number]

: 1980

Sex: Female



Demographics







 Address  [address] [place]  [address]

 

 Home Phone  [phone]

 

 Preferred Language  Unknown

 

 Marital Status  Unknown

 

 Confucianist Affiliation  Unknown

 

 Race  White

 

 Ethnic Group  Not  or 





Author







 HUSSEIN Alvarenga

 

 Organization  [employer]

 

 Address  Unknown

 

 Phone  Unavailable







Care Team Providers







 Care Team Member Name  Role  Phone

 

 HUSSEIN ARIZA  CP  Unavailable



                                                                



Allergies

          No Known Allergies                                                   
                                     



Problems

          





 Problem Type  Condition  Code  Onset Dates  Condition Status

 

 Problem  Family planning, BCP (birth control pills) maintenance  Z30.41     
Active

 

 Problem  Anxiety  F41.9     Active

 

 Problem  HTN (hypertension)  I10     Active



                                                                               
                             



Medications

          





 Medication  Code System  Code  Instructions  Start Date  End Date  Status  
Dosage

 

 Natroba  NDC  [address]-31  0.9 % Externally Once a day  July 15, 2016        
as directed



                                                                              



Results

          No Known Results                                                     
               



Summary Purpose

          [employer] Submission

## 2017-11-02 NOTE — XMS REPORT
[facility]

 Created on: 2016



Courtney Grayson

External Reference #: [id number]

: 1980

Sex: Female



Demographics







 Address  [address] [place]  [address]

 

 Home Phone  [phone]

 

 Preferred Language  Unknown

 

 Marital Status  Unknown

 

 Zoroastrianism Affiliation  Unknown

 

 Race  White

 

 Ethnic Group  Not  or 





Author







 HUSSEIN Alvarenga

 

 Organization  [employer]

 

 Address  Unknown

 

 Phone  Unavailable







Care Team Providers







 Care Team Member Name  Role  Phone

 

 HUSSEIN ARIZA  CP  Unavailable



                                                                



Allergies

          No Known Allergies                                                   
                                     



Problems

          





 Problem Type  Condition  Code  Onset Dates  Condition Status

 

 Problem  Family planning, BCP (birth control pills) maintenance  Z30.41     
Active

 

 Problem  Anxiety  F41.9     Active

 

 Problem  HTN (hypertension)  I10     Active



                                                                               
                             



Medications

          No Known Medications                                                 
                             



Results

          No Known Results                                                     
               



Summary Purpose

          [employer] Submission

## 2017-12-08 NOTE — XMS REPORT
Chief Complaint   Patient presents with   • Follow-up   • Anxiety   • Med Refill     neurontin, [facility]   Jean Ash Peñaloza is a 39 y.o. male.     Anxiety   Presents for follow-up visit. Symptoms include decreased concentration, excessive worry, irritability, nervous/anxious behavior, obsessions, palpitations and panic. Patient reports no chest pain, compulsions, confusion, depressed mood, dizziness, dry mouth, feeling of choking, hyperventilation, impotence, insomnia, malaise, muscle tension, nausea, restlessness, shortness of breath or suicidal ideas. Symptoms occur constantly. The severity of symptoms is moderate. The quality of sleep is good. Nighttime awakenings: none.     Compliance with medications is %.   Neck Pain    This is a new (Dec 17th 2016) problem. The current episode started more than 1 month ago. The problem occurs constantly. The problem has been gradually worsening. The pain is associated with a fall. The pain is present in the anterior neck. The quality of the pain is described as aching. The pain is at a severity of 10/10. The pain is severe. The pain is same all the time. Stiffness is present all day. Associated symptoms include headaches, numbness and weakness. Pertinent negatives include no chest pain, fever, leg pain, pain with swallowing, paresis, photophobia, syncope, tingling, trouble swallowing, visual change or weight loss. Associated symptoms comments: See hospital report and rehab report for further information-has been home from [facility] since last week . He has tried acetaminophen for the symptoms. The treatment provided mild relief.        The following portions of the patient's history were reviewed and updated as appropriate: allergies, current medications, past social history and problem list.    Review of Systems   Constitutional: Positive for activity change, appetite change, irritability and unexpected weight change. Negative for chills,  [facility]

 Created on: 10/31/2015



Kenan  Courtney

External Reference #: [id number]

: 1980

Sex: Female



Demographics







 Address  [address] [place]  [address]

 

 Home Phone  [phone]

 

 Preferred Language  Unknown

 

 Marital Status  Unknown

 

 Anabaptist Affiliation  Unknown

 

 Race  White

 

 Ethnic Group  Not  or 





Author







 ROXANN Lopez

 

 Organization  [employer]

 

 Address  Unknown

 

 Phone  Unavailable







Care Team Providers







 Care Team Member Name  Role  Phone

 

 ROXANN HOWARD  CP  Unavailable



                                                                



Allergies, Adverse Reactions, Alerts

          





 Substance  Reaction  Event Type

 

 Amoxicillin  Info Not Available  Drug Allergy



                                                                               
         



Problems

          





 Problem Type  Condition  Code  Onset Dates  Condition Status

 

 Problem  Cough  786.2     Active

 

 Problem  Screening examination for pulmonary tuberculosis  V74.1     Active

 

 Problem  Acute pharyngitis  462     Active

 

 Problem  Viral warts, unspecified  078.10     Active

 

 Problem  Headache  784.0     Active

 

 Problem  Pain of right thumb  729.5     Active

 

 Problem  Need for prophylactic vaccination and inoculation, Influenza  V04.81 
    Active

 

 Problem  Unspecified conjunctivitis  372.30     Active

 

 Problem  Unspecified hypertrophic and atrophic condition of skin  701.9     
Active

 

 Problem  Postnasal drip  784.91     Active

 

 Assessment  Laryngitis  J04.0     Active

 

 Assessment  Allergic rhinitis  J30.9     Active

 

 Problem  Nausea alone  787.02     Active

 

 Problem  Diarrhea  787.91     Active



                                                                               
                                                                               
                                                            



Medications

          





 Medication  Code System  Code  Instructions  Start Date  End Date  Status  
Dosage

 

 Zyrtec Allergy  NDC  [address]-66  10 MG Orally Once a day at HS  Oct 29, 
2015  Dec 28, 2015     1 tablet as needed

 

 TriNessa (28)  NDC  [address]-73  0.18/0.215/0.25 mg-35 mcg (28)    2013        take 1 tablet by oral route once daily

 

 PredniSONE  NDC  [address]-49  40 mg Orally Once a day  Oct 29, 2015  Nov 03, 
2015     1 tablet with food or milk

 

 Multivitamin  NDC  06748-95850      Aug 22, 2014        1 tablet by Oral route 
1 time per day



                                                                               
                                       



Procedures

          





 Procedure  Coding System  Code  Date

 

 Office Visit, Est Pt., Level 3  CPT-4  97575  Oct 29, 2015



                                                                               
                   



Vital Signs

          





 Date/Time:  Oct 29, 2015

 

 Temperature  97.6 F

 

 Weight  154.3 lbs

 

 Height  68 in

 

 BMI  23.46 Index

 

 Blood Pressure Diastolic  95 mmHg

 

 Blood Pressure Systolic  130 mmHg

 

 Cardiac Monitoring Heart Rate  88 bpm



                                                                              



Results

          No Known Results                                                     
               



Summary Purpose

          [employer] Submission diaphoresis, fatigue, fever and weight loss.        Decreased appetite reported -unable to eat regular due to therapy scheduled 4 days a week in [place]    HENT: Negative for congestion, dental problem, drooling and trouble swallowing.    Eyes: Positive for visual disturbance. Negative for photophobia, pain, discharge, redness and itching.   Respiratory: Negative.  Negative for apnea, cough, choking, chest tightness, shortness of breath and stridor.    Cardiovascular: Positive for palpitations. Negative for chest pain, leg swelling and syncope.   Gastrointestinal: Negative.  Negative for abdominal distention, abdominal pain and nausea.   Endocrine: Negative.  Negative for cold intolerance, heat intolerance, polydipsia, polyphagia and polyuria.   Genitourinary: Positive for difficulty urinating. Negative for decreased urine volume, discharge, dysuria, enuresis, flank pain, frequency, genital sores, hematuria, impotence, penile pain, penile swelling, scrotal swelling, testicular pain and urgency.        ED due to injury    Musculoskeletal: Positive for arthralgias, back pain, gait problem, joint swelling, myalgias, neck pain and neck stiffness.        Wheelchair for transfer    Skin: Negative.    Allergic/Immunologic: Negative.  Negative for environmental allergies, food allergies and immunocompromised state.   Neurological: Positive for speech difficulty, weakness, numbness and headaches. Negative for dizziness, tingling, tremors, seizures, syncope, facial asymmetry and light-headedness.        Left sided weakness from c spine injury    Hematological: Negative.    Psychiatric/Behavioral: Positive for agitation, decreased concentration, dysphoric mood and sleep disturbance. Negative for behavioral problems, confusion, hallucinations, self-injury and suicidal ideas. The patient is nervous/anxious. The patient does not have insomnia and is not hyperactive.    All other systems reviewed and are  "negative.      Objective   /70  Ht 175.3 cm (69\")  Wt 68.9 kg (152 lb)  BMI 22.45 kg/m2  Physical Exam   Constitutional: He is oriented to person, place, and time. He appears well-developed and well-nourished. No distress.   HENT:   Head: Normocephalic and atraumatic.   Mouth/Throat: No oropharyngeal exudate.   Eyes: EOM are normal. Pupils are equal, round, and reactive to light. Right eye exhibits no discharge. Left eye exhibits no discharge. No scleral icterus.   Neck: Normal range of motion. Neck supple. No JVD present. No tracheal deviation present. No thyromegaly present.   Cardiovascular: Normal rate.  Exam reveals no gallop and no friction rub.    No murmur heard.  Pulmonary/Chest: Effort normal and breath sounds normal. No stridor. No respiratory distress. He has no wheezes. He has no rales. He exhibits no tenderness.   Abdominal: Soft. Bowel sounds are normal. He exhibits no distension and no mass. There is tenderness. There is no rebound and no guarding. No hernia.   Musculoskeletal: He exhibits tenderness. He exhibits no edema or deformity.   Wheelchair bound-right sided weakness, decreased rom on right side, left sided numbness    Lymphadenopathy:     He has no cervical adenopathy.   Neurological: He is alert and oriented to person, place, and time. He has normal reflexes. He displays normal reflexes. No cranial nerve deficit. He exhibits normal muscle tone. Coordination normal.   Skin: Skin is warm and dry. No rash noted. He is not diaphoretic. No erythema. No pallor.   Skin tear left lower anterior leg-dark irregular center-peeling around borders    Nursing note and vitals reviewed.      Assessment/Plan   Problem List Items Addressed This Visit        Nervous and Auditory    Brown-Sequard syndrome - Primary    Cervical pain (neck)    Hemiparaplegic syndrome    Muscle pain           New Medications Ordered This Visit   Medications   • mupirocin (BACTROBAN) 2 % ointment     Sig: Apply  topically " 2 (Two) Times a Day.     Dispense:  30 g     Refill:  5   • gabapentin (NEURONTIN) 600 MG tablet     Sig: Take 1 tablet by mouth 3 (Three) Times a Day.     Dispense:  90 tablet     Refill:  2   • clonazePAM (KLONOPIN) 1 MG tablet     Sig: Take 1 tablet by mouth 4 (Four) Times a Day As Needed for Anxiety.     Dispense:  120 tablet     Refill:  0   • gabapentin (NEURONTIN) 400 MG capsule     Sig: Take 1 capsule by mouth 3 (Three) Times a Day.     Dispense:  90 capsule     Refill:  2       Patient understands the risks associated with this controlled medication, including tolerance and addiction.  he also agrees to only obtain this medication from me, and not from a another provider, unless that provider is covering for me in my absence.  he also agrees to be compliant in dosing, and not self adjust the dose of medication.  A signed controlled substance agreement is on file, and he has received a controlled substance education sheet at this a previous visit.  he has also signed a consent for treatment with a controlled substance as per [facility] policy. MAN was obtained.    Plan-Increase protein, continue diet as directed, meds as directed, follow up in 2 months sooner if needed

## 2017-12-20 ENCOUNTER — HOSPITAL ENCOUNTER (OUTPATIENT)
Dept: HOSPITAL 75 - RAD | Age: 37
End: 2017-12-20
Attending: NURSE PRACTITIONER
Payer: COMMERCIAL

## 2017-12-20 DIAGNOSIS — R10.823: Primary | ICD-10-CM

## 2017-12-20 PROCEDURE — 76705 ECHO EXAM OF ABDOMEN: CPT

## 2017-12-20 NOTE — DIAGNOSTIC IMAGING REPORT
EXAMINATION: Ultrasound of the appendix.



INDICATION: Right lower quadrant pain.



FINDINGS: Unremarkable nonspecific tissue and partially obscured

areas from bowel gas is seen. No fluid collection. The appendix

is not seen.



IMPRESSION: The appendix is not seen. Correlate clinically. 



Dictated by: 



  Dictated on workstation # [id number]

## 2017-12-21 ENCOUNTER — HOSPITAL ENCOUNTER (EMERGENCY)
Dept: HOSPITAL 75 - ER | Age: 37
Discharge: HOME | End: 2017-12-21
Payer: COMMERCIAL

## 2017-12-21 VITALS — HEIGHT: 68 IN | BODY MASS INDEX: 25.01 KG/M2 | WEIGHT: 165 LBS

## 2017-12-21 VITALS — DIASTOLIC BLOOD PRESSURE: 76 MMHG | SYSTOLIC BLOOD PRESSURE: 121 MMHG

## 2017-12-21 DIAGNOSIS — F41.9: ICD-10-CM

## 2017-12-21 DIAGNOSIS — K59.00: Primary | ICD-10-CM

## 2017-12-21 DIAGNOSIS — Z87.891: ICD-10-CM

## 2017-12-21 DIAGNOSIS — Z87.19: ICD-10-CM

## 2017-12-21 DIAGNOSIS — F32.9: ICD-10-CM

## 2017-12-21 LAB
ALBUMIN SERPL-MCNC: 4.2 GM/DL (ref 3.2–4.5)
ALT SERPL-CCNC: 15 U/L (ref 0–55)
ANION GAP SERPL CALC-SCNC: 10 MMOL/L (ref 5–14)
AST SERPL-CCNC: 15 U/L (ref 5–34)
BASOPHILS # BLD AUTO: 0.1 10^3/UL (ref 0–0.1)
BASOPHILS NFR BLD AUTO: 1 % (ref 0–10)
BILIRUB SERPL-MCNC: 0.4 MG/DL (ref 0.1–1)
BILIRUB UR QL STRIP: NEGATIVE
BUN SERPL-MCNC: 11 MG/DL (ref 7–18)
BUN/CREAT SERPL: 15
CALCIUM SERPL-MCNC: 9.3 MG/DL (ref 8.5–10.1)
CHLORIDE SERPL-SCNC: 105 MMOL/L (ref 98–107)
CO2 SERPL-SCNC: 23 MMOL/L (ref 21–32)
CREAT SERPL-MCNC: 0.75 MG/DL (ref 0.6–1.3)
EOSINOPHIL # BLD AUTO: 0.3 10^3/UL (ref 0–0.3)
EOSINOPHIL NFR BLD AUTO: 3 % (ref 0–10)
ERYTHROCYTE [DISTWIDTH] IN BLOOD BY AUTOMATED COUNT: 11.9 % (ref 10–14.5)
GFR SERPLBLD BASED ON 1.73 SQ M-ARVRAT: > 60 ML/MIN
GLUCOSE SERPL-MCNC: 99 MG/DL (ref 70–105)
KETONES UR QL STRIP: NEGATIVE
LEUKOCYTE ESTERASE UR QL STRIP: (no result)
LYMPHOCYTES # BLD AUTO: 2.4 X 10^3 (ref 1–4)
LYMPHOCYTES NFR BLD AUTO: 32 % (ref 12–44)
MCH RBC QN AUTO: 30 PG (ref 25–34)
MCHC RBC AUTO-ENTMCNC: 34 G/DL (ref 32–36)
MCV RBC AUTO: 87 FL (ref 80–99)
MONOCYTES # BLD AUTO: 0.6 X 10^3 (ref 0–1)
MONOCYTES NFR BLD AUTO: 7 % (ref 0–12)
NEUTROPHILS # BLD AUTO: 4.4 X 10^3 (ref 1.8–7.8)
NEUTROPHILS NFR BLD AUTO: 57 % (ref 42–75)
NITRITE UR QL STRIP: NEGATIVE
PH UR STRIP: 6 [PH] (ref 5–9)
PLATELET # BLD: 338 10^3/UL (ref 130–400)
PMV BLD AUTO: 9.2 FL (ref 7.4–10.4)
POTASSIUM SERPL-SCNC: 4.1 MMOL/L (ref 3.6–5)
PROT SERPL-MCNC: 7.6 GM/DL (ref 6.4–8.2)
PROT UR QL STRIP: NEGATIVE
RBC # BLD AUTO: 4.56 10^6/UL (ref 4.35–5.85)
SODIUM SERPL-SCNC: 138 MMOL/L (ref 135–145)
SP GR UR STRIP: 1.02 (ref 1.02–1.02)
UROBILINOGEN UR-MCNC: NORMAL MG/DL
WBC # BLD AUTO: 7.7 10^3/UL (ref 4.3–11)
WBC #/AREA URNS HPF: (no result) /HPF

## 2017-12-21 PROCEDURE — 84703 CHORIONIC GONADOTROPIN ASSAY: CPT

## 2017-12-21 PROCEDURE — 74177 CT ABD & PELVIS W/CONTRAST: CPT

## 2017-12-21 PROCEDURE — 85025 COMPLETE CBC W/AUTO DIFF WBC: CPT

## 2017-12-21 PROCEDURE — 36415 COLL VENOUS BLD VENIPUNCTURE: CPT

## 2017-12-21 PROCEDURE — 80053 COMPREHEN METABOLIC PANEL: CPT

## 2017-12-21 PROCEDURE — 81000 URINALYSIS NONAUTO W/SCOPE: CPT

## 2017-12-21 RX ADMIN — SODIUM CHLORIDE SCH MLS/HR: 900 INJECTION, SOLUTION INTRAVENOUS at 18:27

## 2017-12-21 RX ADMIN — KETOROLAC TROMETHAMINE ONE MG: 30 INJECTION, SOLUTION INTRAMUSCULAR at 19:47

## 2017-12-21 RX ADMIN — IOHEXOL ONE ML: 350 INJECTION, SOLUTION INTRAVENOUS at 19:05

## 2017-12-21 RX ADMIN — KETOROLAC TROMETHAMINE ONE ML: 30 INJECTION, SOLUTION INTRAMUSCULAR; INTRAVENOUS at 19:05

## 2017-12-21 RX ADMIN — FENTANYL CITRATE ONE MCG: 50 INJECTION, SOLUTION INTRAMUSCULAR; INTRAVENOUS at 18:25

## 2017-12-21 RX ADMIN — ONDANSETRON ONE MG: 2 INJECTION, SOLUTION INTRAMUSCULAR; INTRAVENOUS at 18:23

## 2017-12-21 NOTE — XMS REPORT
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            ZHANE DAY DO                        
                       

 

 [id number]            2014 14:23:00            2014 23:59:59          
  CLS            Outpatient            ZHANE DAY DO                        
                       

 

 [id number]            2014 13:31:00            2014 23:59:59          
  CLS            Outpatient            ROXANN CONWAY               
                                

 

 [id number]            04/15/2014 15:28:00            04/15/2014 23:59:59          
  CLS            Outpatient            PALMIRA OLIVA                    
                           

 

 [id number]            2014 12:10:00            2014 23:59:59          
  CLS            Outpatient            ROXANN CONWAY               
                                

 

 [id number]            2014 11:31:00            2014 23:59:59          
  CLS            Outpatient            ZHANE DAY DO                        
                       

 

 [id number]            2013 13:40:00            2013 23:59:59          
  CLS            Outpatient            TERRA KEVIN           
                                    

 

 [id number]            2013 11:24:00            2013 23:59:59          
  CLS            Outpatient            ZHANE DAY DO                        
                       

 

 [id number]            2013 13:04:00                                      
Document Registration                                                          
  

 

 [id number]            2017 18:45:00            2017 21:30:00    
        DIS            Outpatient            DORIS RIDLEY            [facility] 
[facility]            ER            HEADACHE        

 

 [id number]            10/23/2017 11:10:00            10/23/2017 23:59:59    
        CLS            Preadmit            CHAMPAGNEWILFREDO APRN            [facility] 
[facility]            RAD            POSTCONCUSSIVE SYNDROME 
F07.81        

 

 [id number]            10/23/2017 11:07:00            10/23/2017 23:59:59    
        CLS            Preadmit            CHAMPAGNEWILFREDO CHEN ANUP APRN            [facility] 
[facility]            RAD            POSTCONCUSSIVE SYNDROME 
F07.81        

 

 [id number]            2017 10:56:00            2017 15:18:00    
        DIS            Outpatient            AP ROWE MD            [facility] 
[facility]            ENDO            ABD PAIN; N/V        

 

 [id number]            2017 05:43:00            2017 23:59:59    
        CLS            Outpatient            AP ROWE MD            [facility] 
[facility]            PREOP            ABD PAIN; N/V        

 

 [id number]            2017 13:03:00            2017 23:59:59    
        CLS            Outpatient            AP ROWE MD            [facility] 
[facility]            RAD            ABD PAIN        

 

 [id number]            2017 07:47:00            2017 12:25:00    
        DIS            Outpatient            AP ROWE MD            [facility] 
[facility]            SDC            DYSKNESIA        

 

 [id number]            2017 08:28:00            2017 08:50:00    
        DIS            Outpatient            AP ROWE MD            [facility] 
[facility]            PREOP            DYSKNESIA        

 

 [id number]            2016 07:03:00            2016 23:59:59    
        CLS            Outpatient            HUSSEIN ARIZA ARNP          
  [facility]            CARD            GENERALIZED ABD 
PAIN        

 

 [id number]            2016 13:43:00            2016 23:59:59    
        CLS            Outpatient            HUSSEIN ARIZA ARNP          
  [facility]            RAD            PELVIC PAIN        

 

 [id number]            2016 09:33:00            2016 23:59:59    
        CLS            Outpatient            HUSSEIN ARIZA ARNP          
  [facility]            RAD            PELVIC PAIN        

 

 [id number]            2015 12:52:00            2015 23:59:59    
        CLS            Outpatient            HUSSEIN ARIZA          
  [facility]            RAD            PAIN OF RIGHT 
THUMB        

 

 [id number]            2017 14:36:00                         ACT       
     Outpatient            DARON ODONNELL            [facility]            RAD            R10.823 RIGHT LOWER QUADRANT 
TENDERNESS        

 

 [id number]            2017 12:54:00                         ACT       
     Outpatient            WILFREDO CHAMPAGNE            [facility]            REHAB            NECK PAIN        

 

 [id number]            2011 06:00:00                                   
   Document Registration

## 2017-12-21 NOTE — ED ABDOMINAL PAIN
General


Chief Complaint:  Abdominal/GI Problems


Stated Complaint:  RT SIDED ABD PAIN


Source of Information:  Patient


Exam Limitations:  No Limitations





History of Present Illness


Time Seen By Provider:  18:06


Initial Comments


ER with right lower quadrant abdominal pain for 4 days.  She saw her primary 

care provider yesterday who ordered an outpatient ultrasound of the right lower 

quadrant.  Appendix was not visualized on ultrasound and she has not been given 

any further direction but the pain worsens.  She has associated nausea and 

diarrhea.


Timing/Duration:  3-4 Days


Severity/Quality:  Severe


Location:  [facility]


Radiation:  No Radiation


Activities at Onset:  None





Allergies and Home Medications


Allergies


Coded Allergies:  


     Amoxicillin (Verified  Allergy, Unknown, HAS TAKEN PENICILLIN RECENTLY W/

NO RXN, 6/10/08)





Home Medications


Cetirizine HCl 10 Mg Tablet, 10 MG PO HS, (Reported)


Citalopram Hydrobromide 20 Mg Tablet, 20 MG PO DAILY, (Reported)


Cyclobenzaprine HCl 10 Mg Tablet, 10 MG PO Q8H, #15 Ref 0


   Prescribed by: DORIS RIDLEY on 11/2/17 2112


Dicyclomine HCl 10 Mg Capsule, 10 MG PO QID, #120


   Prescribed by: AP ROWE on 3/8/17 1434


Hydrocodone/Acetaminophen 1 Each Tablet, 1-2 EACH PO q4-6 hrs PRN for PAIN, (

Reported)


Lisinopril 10 Mg Tablet, 10 MG PO DAILY, (Reported)


Norgestimate-Ethinyl Estradiol 1 Each Tablet, 1 EACH PO DAILY, (Reported)


Promethazine HCl 25 Mg Tablet, 12.5-25 MG PO Q4H PRN for NAUSEA/VOMITING, (

Reported)


   TAKE 1/2 TO 1 (25MG) TAB 





Review of Systems


Constitutional:  see HPI


EENTM:  No Symptoms Reported


Respiratory:  No Symptoms Reported


Cardiovascular:  No Symptoms Reported


Gastrointestinal:  See HPI, Abdominal Pain, Diarrhea, Nausea


Genitourinary:  No Symptoms Reported


Musculoskeletal:  no symptoms reported


Skin:  no symptoms reported


Psychiatric/Neurological:  No Symptoms Reported


Endocrine:  No Symptoms Reported





Past Medical-Social-Family Hx


Patient Social History


Alcohol Use:  Denies Use


Recreational Drug Use:  No


Type Used:  Cigarettes


Former Smoker, Quit:  Jan 4, 2008


Recent Foreign Travel:  No


Contact w/Someone Who Travel:  No


Recent Hopitalizations:  No





Immunizations Up To Date


Tetanus Booster (TDap):  Unknown





Seasonal Allergies


Seasonal Allergies:  Yes





Surgeries


History of Surgeries:  Yes (WISDOM TEETH)





Respiratory


History of Respiratory Disorde:  No





Cardiovascular


History of Cardiac Disorders:  No





Neurological


History of Neurological Disord:  No





Reproductive System


Hx Reproductive Disorders:  No





Gastrointestinal


History of Gastrointestinal Di:  Yes


Gastrointestinal Disorders:  Gall Bladder Disease





Musculoskeletal


History of Musculoskeletal Dis:  No





Endocrine


History of Endocrine Disorders:  No





HEENT


Loss of Vision:  Bilateral


Hearing Impairment:  Denies





Cancer


History of Cancer:  No





Psychosocial


History of Psychiatric Problem:  Yes


Behavioral Health Disorders:  Anxiety, Depression





Integumentary


History of Skin or Integumenta:  No





Blood Transfusions


History of Blood Disorders:  No





Physical Exam


Vital Signs





 VS - Last 72 Hours, by Label








 12/21/17





 17:25


 


Temp 98.9


 


Pulse 92


 


Resp 18


 


B/P (MAP) 110/78 (89)


 


Pulse Ox 98


 


O2 Delivery Room Air





Capillary Refill :


General Appearance:  WD/WN, no apparent distress


HEENT:  PERRL/EOMI, normal ENT inspection


Neck:  non-tender, full range of motion


Respiratory:  no respiratory distress, no accessory muscle use


Cardiovascular:  regular rate, rhythm, no murmur


Gastrointestinal:  normal bowel sounds, soft, rebound, tenderness


Extremities:  normal range of motion, non-tender


Neurologic/Psychiatric:  alert, normal mood/affect, oriented x 3


Skin:  normal color, warm/dry





Progress/Results/Core Measures


Results/Orders


Lab Results





Laboratory Tests








Test


  12/21/17


17:58 12/21/17


18:20 Range/Units


 


 


Urine Color YELLOW    


 


Urine Clarity CLEAR    


 


Urine pH 6   5-9  


 


Urine Specific Gravity 1.020   1.016-1.022  


 


Urine Protein NEGATIVE   NEGATIVE  


 


Urine Glucose (UA) NEGATIVE   NEGATIVE  


 


Urine Ketones NEGATIVE   NEGATIVE  


 


Urine Nitrite NEGATIVE   NEGATIVE  


 


Urine Bilirubin NEGATIVE   NEGATIVE  


 


Urine Urobilinogen NORMAL   NORMAL  MG/DL


 


Urine Leukocyte Esterase 1+ H  NEGATIVE  


 


Urine RBC (Auto) NEGATIVE   NEGATIVE  


 


Urine RBC NONE    /HPF


 


Urine WBC 0-2    /HPF


 


Urine Squamous Epithelial


Cells 10-25 H


  


   /HPF


 


 


Urine Crystals NONE    /LPF


 


Urine Bacteria FEW H   /HPF


 


Urine Casts NONE    /LPF


 


Urine Mucus SMALL H   /LPF


 


Urine Culture Indicated NO    


 


Urine Pregnancy Test NEGATIVE   NEGATIVE  


 


White Blood Count


  


  7.7 


  4.3-11.0


10^3/uL


 


Red Blood Count


  


  4.56 


  4.35-5.85


10^6/uL


 


Hemoglobin  13.6  11.5-16.0  G/DL


 


Hematocrit  40  35-52  %


 


Mean Corpuscular Volume  87  80-99  FL


 


Mean Corpuscular Hemoglobin  30  25-34  PG


 


Mean Corpuscular Hemoglobin


Concent 


  34 


  32-36  G/DL


 


 


Red Cell Distribution Width  11.9  10.0-14.5  %


 


Platelet Count


  


  338 


  130-400


10^3/uL


 


Mean Platelet Volume  9.2  7.4-10.4  FL


 


Neutrophils (%) (Auto)  57  42-75  %


 


Lymphocytes (%) (Auto)  32  12-44  %


 


Monocytes (%) (Auto)  7  0-12  %


 


Eosinophils (%) (Auto)  3  0-10  %


 


Basophils (%) (Auto)  1  0-10  %


 


Neutrophils # (Auto)  4.4  1.8-7.8  X 10^3


 


Lymphocytes # (Auto)  2.4  1.0-4.0  X 10^3


 


Monocytes # (Auto)  0.6  0.0-1.0  X 10^3


 


Eosinophils # (Auto)


  


  0.3 


  0.0-0.3


10^3/uL


 


Basophils # (Auto)


  


  0.1 


  0.0-0.1


10^3/uL


 


Sodium Level  138  135-145  MMOL/L


 


Potassium Level  4.1  3.6-5.0  MMOL/L


 


Chloride Level  105    MMOL/L


 


Carbon Dioxide Level  23  21-32  MMOL/L


 


Anion Gap  10  5-14  MMOL/L


 


Blood Urea Nitrogen  11  7-18  MG/DL


 


Creatinine


  


  0.75 


  0.60-1.30


MG/DL


 


Estimat Glomerular Filtration


Rate 


  > 60 


   


 


 


BUN/Creatinine Ratio  15   


 


Glucose Level  99    MG/DL


 


Calcium Level  9.3  8.5-10.1  MG/DL


 


Total Bilirubin  0.4  0.1-1.0  MG/DL


 


Aspartate Amino Transf


(AST/SGOT) 


  15 


  5-34  U/L


 


 


Alanine Aminotransferase


(ALT/SGPT) 


  15 


  0-55  U/L


 


 


Alkaline Phosphatase  89    U/L


 


Total Protein  7.6  6.4-8.2  GM/DL


 


Albumin  4.2  3.2-4.5  GM/DL








My Orders





Orders - LUCERO MACDONALD


Cbc With Automated Diff (12/21/17 17:46)


Comprehensive Metabolic Panel (12/21/17 17:46)


Ua Culture If Indicated (12/21/17 17:46)


Saline Lock/Iv-Start (12/21/17 17:46)


Ct Abd/Pelv W (Appendicitis) (12/21/17 17:46)


Iohexol Injection (Omnipaque 350 Mg/Ml 1 (12/21/17 18:00)


Ns (Ivpb) (Sodium Chloride 0.9% Ivpb Bag (12/21/17 18:00)


Pharmacy Communication (Pharmacy Communi (12/21/17 17:59)


Fentanyl  Injection (Sublimaze Injection (12/21/17 18:15)


Ns Iv 1000 Ml (Sodium Chloride 0.9%) (12/21/17 18:15)


Ondansetron Injection (Zofran Injectio (12/21/17 18:15)


Hcg,Qualitative Urine (12/21/17 18:41)





Medications Given in ED





Current Medications








 Medications  Dose


 Ordered  Sig/Randal


 Route  Start Time


 Stop Time Status Last Admin


Dose Admin


 


 Fentanyl Citrate  50 mcg  ONCE  ONCE


 IVP  12/21/17 18:15


 12/21/17 18:16 DC 12/21/17 18:25


50 MCG


 


 Iohexol  100 ml  ONCE  ONCE


 IV  12/21/17 18:00


 12/21/17 18:01 DC 12/21/17 19:05


100 ML


 


 Ondansetron HCl  4 mg  ONCE  ONCE


 IVP  12/21/17 18:15


 12/21/17 18:16 DC 12/21/17 18:23


4 MG


 


 Sodium Chloride  100 ml  ONCE  ONCE


 IV  12/21/17 18:00


 12/21/17 18:01 DC 12/21/17 19:05


80 ML








Vital Signs/I&O





Vital Sign - Last 12Hours








 12/21/17





 17:25


 


Temp 98.9


 


Pulse 92


 


Resp 18


 


B/P (MAP) 110/78 (89)


 


Pulse Ox 98


 


O2 Delivery Room Air











Departure


Impression


Impression:  


 Primary Impression:  


 RLQ abdominal pain


 Additional Impression:  


 Constipation


Disposition:  01 HOME, SELF-CARE


Condition:  Stable





Departure-Patient Inst.


Decision time for Depature:  19:37


Referrals:  


ZHANE DAY DO (PCP)


Primary Care Physician








WILFREDO CHAMPAGNE (Family)


Primary Care Physician


Patient Instructions:  Acute Abdomen (Belly Pain), Adult (DC), Constipation, 

Adult (DC)





Add. Discharge Instructions:  


1.  Return to ER for any concerns such as fevers, intolerable pain


2.  Take the laxative as directed


3.  Tylenol and Motrin for pain All discharge instructions reviewed with 

patient and/or family. Voiced understanding.


Scripts


Polyethylene Glycol 3350 (Miralax) 17 Gm Powd.pack


17 GM PO BID, #10 EACH


   Prov: LUCERO MACDONALD         12/21/17 


Ondansetron (Zofran Odt) 8 Mg Tab.rapdis


8 MG PO Q6H Y for NAUSEA/VOMITING-1ST LINE, #10 TAB


   Prov: LUCERO MACDONALD         12/21/17


Work/School Note:  Work Release Form   Date Seen in the Emergency Department:  

Dec 21, 2017


   Return to Work:  Dec 23, 2017











LUCERO MACDONALD Dec 21, 2017 18:08

## 2017-12-21 NOTE — DIAGNOSTIC IMAGING REPORT
PROCEDURE: CT abdomen and pelvis with contrast, rule out

appendicitis.



TECHNIQUE: Multiple contiguous axial images were obtained through

the abdomen and pelvis after the administration of intravenous

contrast.



INDICATION: Right lower quadrant pain, nausea and diarrhea. 



COMPARISON: 3/3/2017. 



FINDINGS: The appendix is visualized, nondilated and normal. No

pericecal or periappendiceal inflammatory process. The

unobstructed kidneys were unremarkable with a benign left renal

cortical cyst, chronic. The gallbladder is absent. There is no

pathological bile duct dilatation. Liver, spleen, adrenals and

pancreas are nonacute. There is no ascites, abscess, hematoma or

fluid collection. The uterus, adnexa and urinary bladder are

unremarkable. No pneumatosis or free air. No abnormal fecal

loading. No ileus or bowel obstruction. Lung bases and the

osseous structures are nonacute. 



IMPRESSION: Normal appendix, unobstructed urinary tracts, no

acute adnexal lesion. No obstructive features, inflammatory

process or acute findings.



Dictated by: 



  Dictated on workstation # [id number]

## 2017-12-29 ENCOUNTER — HOSPITAL ENCOUNTER (OUTPATIENT)
Dept: HOSPITAL 75 - REHAB | Age: 37
Discharge: HOME | End: 2017-12-29
Attending: NURSE PRACTITIONER
Payer: COMMERCIAL

## 2017-12-29 DIAGNOSIS — F07.81: ICD-10-CM

## 2017-12-29 DIAGNOSIS — S13.4XXD: Primary | ICD-10-CM

## 2017-12-29 DIAGNOSIS — M54.2: ICD-10-CM

## 2020-06-19 ENCOUNTER — HOSPITAL ENCOUNTER (EMERGENCY)
Dept: HOSPITAL 75 - ER | Age: 40
Discharge: HOME | End: 2020-06-19
Payer: COMMERCIAL

## 2020-06-19 VITALS — WEIGHT: 203.05 LBS | HEIGHT: 67.72 IN | BODY MASS INDEX: 31.13 KG/M2

## 2020-06-19 VITALS — DIASTOLIC BLOOD PRESSURE: 88 MMHG | SYSTOLIC BLOOD PRESSURE: 132 MMHG

## 2020-06-19 DIAGNOSIS — F32.9: ICD-10-CM

## 2020-06-19 DIAGNOSIS — K62.5: ICD-10-CM

## 2020-06-19 DIAGNOSIS — R10.31: Primary | ICD-10-CM

## 2020-06-19 DIAGNOSIS — F41.9: ICD-10-CM

## 2020-06-19 DIAGNOSIS — Z88.0: ICD-10-CM

## 2020-06-19 LAB
ALBUMIN SERPL-MCNC: 4.2 GM/DL (ref 3.2–4.5)
ALP SERPL-CCNC: 88 U/L (ref 40–136)
ALT SERPL-CCNC: 22 U/L (ref 0–55)
APTT PPP: YELLOW S
BACTERIA #/AREA URNS HPF: (no result) /HPF
BASOPHILS # BLD AUTO: 0 10^3/UL (ref 0–0.1)
BASOPHILS NFR BLD AUTO: 1 % (ref 0–10)
BILIRUB SERPL-MCNC: 0.5 MG/DL (ref 0.1–1)
BILIRUB UR QL STRIP: NEGATIVE
BUN/CREAT SERPL: 13
CALCIUM SERPL-MCNC: 9 MG/DL (ref 8.5–10.1)
CHLORIDE SERPL-SCNC: 106 MMOL/L (ref 98–107)
CO2 SERPL-SCNC: 19 MMOL/L (ref 21–32)
CREAT SERPL-MCNC: 0.79 MG/DL (ref 0.6–1.3)
EOSINOPHIL # BLD AUTO: 0.2 10^3/UL (ref 0–0.3)
EOSINOPHIL NFR BLD AUTO: 3 % (ref 0–10)
ERYTHROCYTE [DISTWIDTH] IN BLOOD BY AUTOMATED COUNT: 12.7 % (ref 10–14.5)
FIBRINOGEN PPP-MCNC: CLEAR MG/DL
GFR SERPLBLD BASED ON 1.73 SQ M-ARVRAT: > 60 ML/MIN
GLUCOSE SERPL-MCNC: 85 MG/DL (ref 70–105)
GLUCOSE UR STRIP-MCNC: NEGATIVE MG/DL
HCT VFR BLD CALC: 39 % (ref 35–52)
HGB BLD-MCNC: 13.3 G/DL (ref 11.5–16)
KETONES UR QL STRIP: NEGATIVE
LEUKOCYTE ESTERASE UR QL STRIP: (no result)
LYMPHOCYTES # BLD AUTO: 2.1 X 10^3 (ref 1–4)
LYMPHOCYTES NFR BLD AUTO: 32 % (ref 12–44)
MANUAL DIFFERENTIAL PERFORMED BLD QL: NO
MCH RBC QN AUTO: 29 PG (ref 25–34)
MCHC RBC AUTO-ENTMCNC: 34 G/DL (ref 32–36)
MCV RBC AUTO: 87 FL (ref 80–99)
MONOCYTES # BLD AUTO: 0.4 X 10^3 (ref 0–1)
MONOCYTES NFR BLD AUTO: 6 % (ref 0–12)
NEUTROPHILS # BLD AUTO: 3.9 X 10^3 (ref 1.8–7.8)
NEUTROPHILS NFR BLD AUTO: 59 % (ref 42–75)
NITRITE UR QL STRIP: NEGATIVE
PH UR STRIP: 6 [PH] (ref 5–9)
PLATELET # BLD: 315 10^3/UL (ref 130–400)
PMV BLD AUTO: 8.8 FL (ref 7.4–10.4)
POTASSIUM SERPL-SCNC: 4 MMOL/L (ref 3.6–5)
PROT SERPL-MCNC: 7.1 GM/DL (ref 6.4–8.2)
PROT UR QL STRIP: NEGATIVE
RBC #/AREA URNS HPF: (no result) /HPF
SODIUM SERPL-SCNC: 135 MMOL/L (ref 135–145)
SP GR UR STRIP: 1.02 (ref 1.02–1.02)
SQUAMOUS #/AREA URNS HPF: (no result) /HPF
WBC # BLD AUTO: 6.6 10^3/UL (ref 4.3–11)
WBC #/AREA URNS HPF: (no result) /HPF

## 2020-06-19 PROCEDURE — 85025 COMPLETE CBC W/AUTO DIFF WBC: CPT

## 2020-06-19 PROCEDURE — 74177 CT ABD & PELVIS W/CONTRAST: CPT

## 2020-06-19 PROCEDURE — 36415 COLL VENOUS BLD VENIPUNCTURE: CPT

## 2020-06-19 PROCEDURE — 84703 CHORIONIC GONADOTROPIN ASSAY: CPT

## 2020-06-19 PROCEDURE — 87088 URINE BACTERIA CULTURE: CPT

## 2020-06-19 PROCEDURE — 81000 URINALYSIS NONAUTO W/SCOPE: CPT

## 2020-06-19 PROCEDURE — 82274 ASSAY TEST FOR BLOOD FECAL: CPT

## 2020-06-19 PROCEDURE — 86141 C-REACTIVE PROTEIN HS: CPT

## 2020-06-19 PROCEDURE — 80053 COMPREHEN METABOLIC PANEL: CPT

## 2020-06-19 NOTE — DIAGNOSTIC IMAGING REPORT
PROCEDURE: CT abdomen and pelvis with contrast.



TECHNIQUE: Multiple contiguous axial images were obtained through

the abdomen and pelvis after administration of intravenous

contrast. Auto Exposure Controls were utilized during the CT exam

to meet ALARA standards for radiation dose reduction. 



INDICATION: Rectal bleeding.



FINDINGS: The prior CT abdomen/pelvis exam of 12/21/2017 failed

to show any sign of an acute abnormality.



Reportedly, the patient has rectal bleeding. There is no mass or

hematoma in the perirectal region. There are a few diverticula

throughout the sigmoid colon but there is no sign of acute

diverticulitis. The appendix was visualized and is not abnormally

thickened.



There is no pelvic mass or free fluid collection noted. The

uterus does not appear to be enlarged. However, there is a

suggestion of a possible 2-3 cm fibroid in the uterine fundus. If

further study is desired, ultrasound recommended. The urinary

bladder is grossly unremarkable.



As on the prior exam, the liver is mildly enlarged. The liver is

of lower density than usually seen and this does suggest fatty

metamorphosis. The spleen, pancreas, adrenals, kidneys, aorta and

inferior vena cava show no sign of an acute abnormality. As noted

previously, the gallbladder is surgically absent. The stomach is

not well distended and difficult to assess.



The lung bases are clear. The bone windows show no sign of a

fracture or of a destructive lesion. 



IMPRESSION:

1. There is no acute abnormality identified. In particular, there

is no sign of a mass or hematoma in the region of the rectum.

2. There is mild hepatomegaly and fatty metamorphosis.

3. The gallbladder is surgically absent. 

4. There is a question of a fibroid involving the uterine fundus.

Recommendations as above. 



Dictated by: 



  Dictated on workstation # [id number]

## 2020-06-19 NOTE — XMS REPORT
Continuity of Care Document

                             Created on: 2020



NEEMATABBY

External Reference #: [id number]

: 1980

Sex: Female



Demographics





                          Address                   [address]

[place]  [address]

 

                          Home Phone                [phone] x

 

                          Preferred Language        Unknown

 

                          Marital Status            Unknown

 

                          Anabaptist Affiliation     Unknown

 

                          Race                      Unknown

 

                          Ethnic Group              Unknown





Author





                          Organization              Unknown

 

                          Address                   Unknown

 

                          Phone                     Unavailable



              



Allergies

      



             Active           Description           Code           Type         

  Severity   

                Reaction           Onset           Reported/Identified          

 

Relationship to Patient                 Clinical Status        

 

             Yes           amoxicillin           [id number]           Drug Aller

gy           

Unknown           HAS TAKEN PENIC                           06/10/2008          

    

                                                 

 

             Yes           amoxicillin                        Drug Allergy      

     N/A      

             N/A                        2009                              

   

 

           Yes           Mobic                       Drug Allergy           N/A 

          

N/A                             2010                                    



                      



Medications

      



There is no data.                  



Problems

      



             Date Dx Coded           Attending           Type           Code    

       

Diagnosis                               Diagnosed By        

 

             2009                                     477.9           CADEN

RGIC RHINITIS 

CAUSE UNSPECIFIED                                

 

             2009           ZHANE DAY DO                        477.9 

          

ALLERGIC RHINITIS CAUSE UNSPECIFIED                    

 

                2009           TERRA KEVIN              

             477.9  

                          ALLERGIC RHINITIS CAUSE UNSPECIFIED                   

 

 

             2009           ZHANE DAY DO                        477.9 

          

ALLERGIC RHINITIS CAUSE UNSPECIFIED                    

 

                2009           ROXANN CONWAY R                  

         477.9      

                          ALLERGIC RHINITIS CAUSE UNSPECIFIED                   

 

 

             2009           PALMIRA OLIVA                        47

7.9           

ALLERGIC RHINITIS CAUSE UNSPECIFIED                    

 

                2009           ROXANN CONWAY R                  

         477.9      

                          ALLERGIC RHINITIS CAUSE UNSPECIFIED                   

 

 

             2009           ZHANE DAY DO K                        477.9 

          

ALLERGIC RHINITIS CAUSE UNSPECIFIED                    

 

             2009           ZHANE DAY DO K                        477.9 

          

ALLERGIC RHINITIS CAUSE UNSPECIFIED                    

 

             2010                                     959.4           INJU

RY, OTHER AND 

UNSPECIFIED, HAND, EXCEPT FINGER                    

 

             2010           ZHANE DAY DO K                        959.4 

          

INJURY, OTHER AND UNSPECIFIED, HAND, EXCEPT FINGER                    

 

                2010           TERRA KEVIN N              

             959.4  

                          INJURY, OTHER AND UNSPECIFIED, HAND, EXCEPT FINGER    

                

 

             2010           ZHANE DAY DO K                        959.4 

          

INJURY, OTHER AND UNSPECIFIED, HAND, EXCEPT FINGER                    

 

                2010           ROXANN CONWAY R                  

         959.4      

                          INJURY, OTHER AND UNSPECIFIED, HAND, EXCEPT FINGER    

                

 

             2010           PALMIRA OLIVA                        95

9.4           

INJURY, OTHER AND UNSPECIFIED, HAND, EXCEPT FINGER                    

 

                2010           ROXANN CONWAY R                  

         959.4      

                          INJURY, OTHER AND UNSPECIFIED, HAND, EXCEPT FINGER    

                

 

             2010           DAY DO, ZHANE K                        959.4 

          

INJURY, OTHER AND UNSPECIFIED, HAND, EXCEPT FINGER                    

 

             2010           DAY DO, ZHANE K                        959.4 

          

INJURY, OTHER AND UNSPECIFIED, HAND, EXCEPT FINGER                    

 

             2010                                     599.0           URIN

CLARY TRACT 

INFECTION, SITE NOT SPECIFIED                    

 

             2010           DAY DO, ZHANE K                        599.0 

          

URINARY TRACT INFECTION, SITE NOT SPECIFIED                    

 

                2010           TERRA KEVIN N              

             599.0  

                          URINARY TRACT INFECTION, SITE NOT SPECIFIED           

         

 

             2010           DAY DO, ZHANE K                        599.0 

          

URINARY TRACT INFECTION, SITE NOT SPECIFIED                    

 

                2010           ROXANN CONWAY R                  

         599.0      

                          URINARY TRACT INFECTION, SITE NOT SPECIFIED           

         

 

             2010           PALMIRA OLIVA                        59

9.0           

URINARY TRACT INFECTION, SITE NOT SPECIFIED                    

 

                2010           ROXANN CONWAY R                  

         599.0      

                          URINARY TRACT INFECTION, SITE NOT SPECIFIED           

         

 

             2010           DAY DO, ZHANE K                        599.0 

          

URINARY TRACT INFECTION, SITE NOT SPECIFIED                    

 

             2010           DAY DO, ZHANE K                        599.0 

          

URINARY TRACT INFECTION, SITE NOT SPECIFIED                    

 

             2011                        Ot           642.31           TRA

NS HYPERTEN-

DELIVERED                                        

 

             2011                        Ot           664.11           DEL

 W 2 DEG 

LACERAT-DEL                                      

 

             2011                        Ot           V27.0           DELI

ESAU-SINGLE 

LIVEBORN                                         

 

           2012                                   078.10           WARTS  

           

      

 

             2012           DAY DOBENTONA K                        078.10

           

WARTS                                            

 

                2012           TERRA KEVIN N              

             078.10 

                          WARTS                              

 

             2012           DAY DO, ZHANE K                        078.10

           

WARTS                                            

 

                2012           ROXANN CONWAY R                  

         078.10     

                          WARTS                              

 

                2012           PALMIRA OLIVA                       

    078.10          

                          WARTS                              

 

                2012           ROXANN CONWAY R                  

         078.10     

                          WARTS                              

 

             2012           DAY DO, ZHANE K                        078.10

           

WARTS                                            

 

             2012           DAY DO, ZHANE K                        078.10

           

WARTS                                            

 

             10/16/2012                                     V04.81           FLU

 DX (3 YRS AND 

ABOVE, IM)                                       

 

             10/16/2012           SHAY DO, ZHANE K                        V04.81

           FLU

DX (3 YRS AND ABOVE, IM)                         

 

                10/16/2012           HARINI MASTERS APRCRICKET TERRA N              

             V04.81 

                          FLU DX (3 YRS AND ABOVE, IM)                    

 

             10/16/2012           SHAY DO, ZHANE K                        V04.81

           FLU

DX (3 YRS AND ABOVE, IM)                         

 

                10/16/2012           ROXANN CONWAY R                  

         V04.81     

                          FLU DX (3 YRS AND ABOVE, IM)                    

 

                10/16/2012           PALMIRA OLIVA                       

    V04.81          

                          FLU DX (3 YRS AND ABOVE, IM)                    

 

                10/16/2012           ROXANN CONWAY R                  

         V04.81     

                          FLU DX (3 YRS AND ABOVE, IM)                    

 

             10/16/2012           DAY DO, ZHANE K                        V04.81

           FLU

DX (3 YRS AND ABOVE, IM)                         

 

             10/16/2012           DAY DO, ZHANE K                        V04.81

           FLU

DX (3 YRS AND ABOVE, IM)                         

 

           2013                                   784.0           HEADACHE

           

        

 

             2013           DAY DO, ZHANE K                        784.0 

          

HEADACHE                                         

 

                2013           SCHULER SONAM PATELCRICKET, TERRA N              

             784.0  

                          HEADACHE                           

 

             2013           DAY DO, ZHANE K                        784.0 

          

HEADACHE                                         

 

                2013           LEE CRABTREE, ROXANN R                  

         784.0      

                          HEADACHE                           

 

             2013           PALMIRA OLIVA                        78

4.0           

HEADACHE                                         

 

                2013           SHAILA CONWAYIA R                  

         784.0      

                          HEADACHE                           

 

             2013           DAY DO, ZHANE K                        784.0 

          

HEADACHE                                         

 

             2013           DAY DO, ZHANE K                        784.0 

          

HEADACHE                                         

 

             2013                                     784.91           POS

TNASAL DRIP    

                                                 

 

             2013           DAY DO, ZHANE K                        784.91

           

POSTNASAL DRIP                                   

 

                2013           TERRA KEVIN N              

             784.91 

                          POSTNASAL DRIP                     

 

             2013           DAY DO, ZHANE K                        784.91

           

POSTNASAL DRIP                                   

 

                2013           HAFSA CONWAYRICIA R                  

         784.91     

                          POSTNASAL DRIP                     

 

                2013           PALMIRA OLIVA                       

    784.91          

                          POSTNASAL DRIP                     

 

                2013           SHAILA CONWAYIA R                  

         784.91     

                          POSTNASAL DRIP                     

 

             2013           DAY DO, ZHANE K                        784.91

           

POSTNASAL DRIP                                   

 

             2013           DAY DO, ZHANE K                        784.91

           

POSTNASAL DRIP                                   

 

                2013           HARINI CRABTREE, TERRA N              

             784.42 

                          DYSPHONIA                          

 

                2013           SCHULERERIKA CRABTREE, TERRA N              

             786.2  

                          COUGH                              

 

             2013           DAY DO, ZHANE K                        784.42

           

DYSPHONIA                                        

 

             2013           DAY DO, ZHANE K                        786.2 

          

COUGH                                            

 

                2013           HAFSA CONWAYRICIA R                  

         784.42     

                          DYSPHONIA                          

 

                2013           LEE CRABTREE, ROXANN R                  

         786.2      

                          COUGH                              

 

                2013           PALMIRA OLIVA                       

    784.42          

                          DYSPHONIA                          

 

             2013           PALMIRA OLIVA                        78

6.2           

COUGH                                            

 

                2013           HAFSA CONWAYRICIA R                  

         784.42     

                          DYSPHONIA                          

 

                2013           LEE CRABTREE ROXANN R                  

         786.2      

                          COUGH                              

 

             2013           DAY DO, ZHANE K                        784.42

           

DYSPHONIA                                        

 

             2013           DAY DO, ZHANE K                        786.2 

          

COUGH                                            

 

             2013           DAY DO, ZHANE K                        784.42

           

DYSPHONIA                                        

 

             2013           DAY DO, ZHANE K                        786.2 

          

COUGH                                            

 

             2014           DAY DO, ZHANE K                        462   

        ACUTE 

PHARYNGITIS                                      

 

                2014           SHAILA CONWAYIA R                  

         462        

                          ACUTE PHARYNGITIS                    

 

             2014           PALMIRA OLIVA                        46

2           

ACUTE PHARYNGITIS                                

 

                2014           ROXANN CONWAY R                  

         462        

                          ACUTE PHARYNGITIS                    

 

             2014           DAY DO, ZHANE K                        462   

        ACUTE 

PHARYNGITIS                                      

 

             2014           DAY DO, ZHANE K                        462   

        ACUTE 

PHARYNGITIS                                      

 

                2014           SHAILA CONWAYIA R                  

         372.30     

                          CONJUNCTIVITIS UNSPECIFIED                    

 

                2014           PALMIRA OLIVA                       

    372.30          

                          CONJUNCTIVITIS UNSPECIFIED                    

 

                2014           ROXANN CONWAY R                  

         372.30     

                          CONJUNCTIVITIS UNSPECIFIED                    

 

             2014           DAY DO, ZHANE K                        372.30

           

CONJUNCTIVITIS UNSPECIFIED                       

 

             2014           DAY DO, ZHANE K                        372.30

           

CONJUNCTIVITIS UNSPECIFIED                       

 

             04/15/2014           PALMIRA OLIVA                        70

1.9           

SKIN TAG                                         

 

                04/15/2014           SHAILA CONWAYIA R                  

         701.9      

                          SKIN TAG                           

 

             04/15/2014           DAY DO, ZHANE K                        701.9 

          SKIN

TAG                                              

 

             04/15/2014           DAY DO, ZHANE K                        701.9 

          SKIN

TAG                                              

 

                2014           SHAILA CONWAYIA R                  

         787.02     

                          NAUSEA ALONE                       

 

                2014           HOWARD APRHAFSA HARLEYROXANN R                  

         787.91     

                          DIARRHEA                           

 

             2014           DAY DO, ZHANE K                        787.02

           

NAUSEA ALONE                                     

 

             2014           DAY DO, ZHANE K                        787.91

           

DIARRHEA                                         

 

             2014           DAY DO, ZHANE K                        787.02

           

NAUSEA ALONE                                     

 

             2014           DAY DO, ZHANE K                        787.91

           

DIARRHEA                                         

 

             2014           DAY DO, ZHANE K                        V74.1 

          

SCREENING EXAMINATION FOR PULMONARY TUBERCULOSIS                    

 

             2014           DAY DO, ZHANE K                        V74.1 

          

SCREENING EXAMINATION FOR PULMONARY TUBERCULOSIS                    

 

                2015           HUSSEIN ARIZA ARNP           Ot      

        729.5      

                                                             

 

                2016           HUSSEIN ARIZA A ARNP           Ot      

        729.5      

                          PAIN IN LIMB                       

 

                2016           NIKOLAI ARIZAE A ARNP           Ot      

        729.5      

                          PAIN IN LIMB                       

 

                2016           HUSSEIN ARIZA A ARNP           Ot      

        R10.2      

                          PELVIC AND PERINEAL PAIN                    

 

                2016           HUSSEIN ARIZA A ARNP           Ot      

        R10.2      

                          PELVIC AND PERINEAL PAIN                    

 

                2016           NIKOLAI ARIZAE A ARNP           Ot      

        R10.2      

                          PELVIC AND PERINEAL PAIN                    

 

                2016           NIKOLAI ARIZAE A ARNP           Ot      

        R10.2      

                          PELVIC AND PERINEAL PAIN                    

 

                2016           NIKOLAI ARIZAE A ARNP           Ot      

        R10.84     

                          GENERALIZED ABDOMINAL PAIN                    

 

                2016           NIKOLAI ARIZAE A ARNP           Ot      

        R10.84     

                          GENERALIZED ABDOMINAL PAIN                    

 

             2017           AP ROWE MD           Ot           K82.8 

          

OTHER SPECIFIED DISEASES OF GALLBLADDER                    

 

             2017           AP ROWE MD           Ot           Z01.81

2           

ENCOUNTER FOR PREPROCEDURAL LABORATORY E                    

 

             2017           AP ROWE MD, Ot           Z11.2 

          

ENCOUNTER FOR SCREENING FOR OTHER BACTER                    

 

             2017           AP ROWE MD           Ot           K81.1 

          

CHRONIC CHOLECYSTITIS                            

 

             2017           KIDO MD, TAKAAKI           Ot           K81.1 

          

CHRONIC CHOLECYSTITIS                            

 

                2017           HUSSEIN ARIZA ARNP           Ot      

        R10.84     

                          GENERALIZED ABDOMINAL PAIN                    

 

             2017           AP ROWE MD           Ot           K81.1 

          

CHRONIC CHOLECYSTITIS                            

 

             2017           AP ROWE MD           Ot           K81.1 

          

CHRONIC CHOLECYSTITIS                            

 

             2017           AP ROWE MD           Ot           K59.09

           

OTHER CONSTIPATION                               

 

             2017           AP ROWE MD           Ot           R11.2 

          

NAUSEA WITH VOMITING, UNSPECIFIED                    

 

             2017           AP ROWE MD           Ot           Z01.81

8           

ENCOUNTER FOR OTHER PREPROCEDURAL EXAMIN                    

 

             2017           AP ROWE MD           Ot           K21.0 

          

GASTRO-ESOPHAGEAL REFLUX DISEASE WITH ES                    

 

             2017           AP ROWE MD           Ot           K29.60

           

OTHER GASTRITIS WITHOUT BLEEDING                    

 

             2017           AP ROWE MD           Ot           K44.9 

          

DIAPHRAGMATIC HERNIA WITHOUT OBSTRUCTION                    

 

             2017           AP ROWE MD           Ot           K63.5 

          

POLYP OF COLON                                   

 

             2017           AP ROWE MD           Ot           K21.0 

          

GASTRO-ESOPHAGEAL REFLUX DISEASE WITH ES                    

 

             2017           AP ROWE MD           Ot           K29.60

           

OTHER GASTRITIS WITHOUT BLEEDING                    

 

             2017           AP ROWE MD           Ot           K44.9 

          

DIAPHRAGMATIC HERNIA WITHOUT OBSTRUCTION                    

 

             2017           AP ROWE MD           Ot           K63.5 

          

POLYP OF COLON                                   

 

             03/15/2017           AP ROWE MD           Ot           K21.0 

          

GASTRO-ESOPHAGEAL REFLUX DISEASE WITH ES                    

 

             03/15/2017           AP ROWE MD           Ot           K29.60

           

OTHER GASTRITIS WITHOUT BLEEDING                    

 

             03/15/2017           AP ROWE MD           Ot           K44.9 

          

DIAPHRAGMATIC HERNIA WITHOUT OBSTRUCTION                    

 

             03/15/2017           AP ROWE MD           Ot           K63.5 

          

POLYP OF COLON                                   

 

             2017           AP ROWE MD           Ot           K59.09

           

OTHER CONSTIPATION                               

 

             2017           AP ROWE MD           Ot           K59.09

           

OTHER CONSTIPATION                               

 

                2017           HUSSEIN ARIZA ARNP           Ot      

        729.5      

                          PAIN IN LIMB                       

 

                2017           HUSSEIN ARIZA ARNP           Ot      

        R10.2      

                          PELVIC AND PERINEAL PAIN                    

 

                2017           HUSSEIN ARIZA ARNP           Ot      

        R10.2      

                          PELVIC AND PERINEAL PAIN                    

 

                2017           HUSSEIN ARIZA ARNP           Ot      

        R10.84     

                          GENERALIZED ABDOMINAL PAIN                    

 

             2017           JAE CONTRERAS, AP           Ot           K59.09

           

OTHER CONSTIPATION                               

 

             2017           JAE CONTRERAS, AP           Ot           R11.2 

          

NAUSEA WITH VOMITING, UNSPECIFIED                    

 

             2017           JAE CONTRERAS, AP           Ot           Z01.81

8           

ENCOUNTER FOR OTHER PREPROCEDURAL EXAMIN                    

 

             2017           KEYANA, DORIS ARNP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        

 

                    Serum or plasma glucose measurement (mass/volume)           

99 mg/dL            

        

 

                    Serum or plasma calcium measurement (mass/volume)           

9.3 mg/dL           

8.5-10.1        

 

                          Serum or plasma total bilirubin measurement (mass/volu

me)           0.4 mg/dL   

                                        0.1-1.0        

 

                                        Serum or plasma alkaline phosphatase aram

surement (enzymatic activity/volume)    

                          89 U/L                            

 

                                        Serum or plasma aspartate aminotransfera

se measurement (enzymatic 

activity/volume)           15 U/L                    5-34        

 

                                        Serum or plasma alanine aminotransferase

 measurement (enzymatic activity/volume)

                          15 U/L                    0-55        

 

                    Serum or plasma protein measurement (mass/volume)           

7.6 g/dL            

6.4-8.2        

 

                    Serum or plasma albumin measurement (mass/volume)           

4.2 g/dL            

3.2-4.5        

 

                                        CULTURE, URINE - 18 11:39         

 

                    CULTURE, URINE, ROUTINE           SEE NOTE            NRG   

     

 

                                        TSH - 10/18/18 09:45         

 

                    TSH                 1.28 mIU/L           NRG        

 

                                        CULTURE, URINE - 19 17:54         

 

                    CULTURE, URINE, ROUTINE           SEE NOTE            NRG   

     

 

                                        Complete urinalysis with reflex to cultu

re - 20 15:35         

 

                    Urine color determination           YELLOW              NRG 

       

 

                    Urine clarity determination           CLEAR               NR

G        

 

                    Urine pH measurement by test strip           6.0            

     5-9        

 

                    Specific gravity of urine by test strip           1.020     

          1.016-1.022  

      

 

                          Urine protein assay by test strip, semi-quantitative  

         NEGATIVE         

                                        NEGATIVE        

 

                    Urine glucose detection by automated test strip           NE

GATIVE            

NEGATIVE        

 

                          Erythrocytes detection in urine sediment by light micr

oscopy           NEGATIVE 

                                        NEGATIVE        

 

                    Urine ketones detection by automated test strip           NE

GATIVE            

NEGATIVE        

 

                    Urine nitrite detection by test strip           NEGATIVE    

        NEGATIVE    

    

 

                    Urine total bilirubin detection by test strip           NEGA

TIVE            

NEGATIVE        

 

                          Urine urobilinogen measurement by automated test strip

 (mass/volume)           

0.2 mg/dL                               < = 1.0        

 

                    Urine leukocyte esterase detection by dipstick           TRA

CE               

NEGATIVE        

 

                                        Automated urine sediment erythrocyte cou

nt by microscopy (number/high power 

field)                    RARE                      NRG        

 

                                        Automated urine sediment leukocyte count

 by microscopy (number/high power field)

                           [HPF]                    NRG        

 

                          Bacteria detection in urine sediment by light microsco

py           FEW          

                                        NRG        

 

                                        Squamous epithelial cells detection in u

rine sediment by light microscopy       

                          2-5                       NRG        

 

                          Crystals detection in urine sediment by light microsco

py           NONE         

                                        NRG        

 

                    Casts detection in urine sediment by light microscopy       

    NONE                

NRG        

 

                          Mucus detection in urine sediment by light microscopy 

          SMALL           

                                        NRG        

 

                    Complete urinalysis with reflex to culture           YES    

             NRG        

 

                                        Complete blood count (CBC) with automate

d white blood cell (WBC) differential - 

20 15:57         

 

                          Blood leukocytes automated count (number/volume)      

     6.6 10*3/uL          

                                        4.3-11.0        

 

                          Blood erythrocytes automated count (number/volume)    

       4.52 10*6/uL       

                                        4.35-5.85        

 

                    Venous blood hemoglobin measurement (mass/volume)           

13.3 g/dL           

11.5-16.0        

 

                    Blood hematocrit (volume fraction)           39 %           

     35-52        

 

                    Automated erythrocyte mean corpuscular volume           87 [

foz_us]           

80-99        

 

                                        Automated erythrocyte mean corpuscular h

emoglobin (mass per erythrocyte)        

                          29 pg                     25-34        

 

                                        Automated erythrocyte mean corpuscular h

emoglobin concentration measurement 

(mass/volume)             34 g/dL                   32-36        

 

                    Automated erythrocyte distribution width ratio           12.

7 %              10.0-

14.5        

 

                    Automated blood platelet count (count/volume)           315 

10*3/uL           

130-400        

 

                          Automated blood platelet mean volume measurement      

     8.8 [foz_us]         

                                        7.4-10.4        

 

                    Automated blood neutrophils/100 leukocytes           59 %   

             42-75       

 

 

                    Automated blood lymphocytes/100 leukocytes           32 %   

             12-44       

 

 

                    Blood monocytes/100 leukocytes           6 %                

 0-12        

 

                    Automated blood eosinophils/100 leukocytes           3 %    

             0-10        

 

                    Automated blood basophils/100 leukocytes           1 %      

           0-10        

 

                    Blood neutrophils automated count (number/volume)           

3.9 10*3            

1.8-7.8        

 

                    Blood lymphocytes automated count (number/volume)           

2.1 10*3            

1.0-4.0        

 

                    Blood monocytes automated count (number/volume)           0.

4 10*3            

0.0-1.0        

 

                    Automated eosinophil count           0.2 10*3/uL           0

.0-0.3        

 

                    Automated blood basophil count (count/volume)           0.0 

10*3/uL           

0.0-0.1        

 

                                        Comprehensive metabolic panel - 20

 15:57         

 

                          Serum or plasma sodium measurement (moles/volume)     

      135 mmol/L          

                                        135-145        

 

                          Serum or plasma potassium measurement (moles/volume)  

         4.0 mmol/L       

                                        3.6-5.0        

 

                          Serum or plasma chloride measurement (moles/volume)   

        106 mmol/L        

                                                

 

                    Carbon dioxide           19 mmol/L           21-32        

 

                          Serum or plasma anion gap determination (moles/volume)

           10 mmol/L      

                                        5-14        

 

                          Serum or plasma urea nitrogen measurement (mass/volume

)           10 mg/dL      

                                        7-18        

 

                          Serum or plasma creatinine measurement (mass/volume)  

         0.79 mg/dL       

                                        0.60-1.30        

 

                    Serum or plasma urea nitrogen/creatinine mass ratio         

  13                  NRG 

       

 

                                        Serum or plasma creatinine measurement w

ith calculation of estimated glomerular 

filtration rate           >                         NRG        

 

                    Serum or plasma glucose measurement (mass/volume)           

85 mg/dL            

        

 

                    Serum or plasma calcium measurement (mass/volume)           

9.0 mg/dL           

8.5-10.1        

 

                          Serum or plasma total bilirubin measurement (mass/volu

me)           0.5 mg/dL   

                                        0.1-1.0        

 

                                        Serum or plasma alkaline phosphatase aram

surement (enzymatic activity/volume)    

                          88 U/L                            

 

                                        Serum or plasma aspartate aminotransfera

se measurement (enzymatic 

activity/volume)           20 U/L                    5-34        

 

                                        Serum or plasma alanine aminotransferase

 measurement (enzymatic activity/volume)

                          22 U/L                    0-55        

 

                    Serum or plasma protein measurement (mass/volume)           

7.1 g/dL            

6.4-8.2        

 

                    Serum or plasma albumin measurement (mass/volume)           

4.2 g/dL            

3.2-4.5        

 

                    CALCIUM CORRECTED           8.8 mg/dL           8.5-10.1    

    

 

                                        Serum or plasma C reactive protein measu

rement (mass/volume) - 20 15:57   

      

 

                          Serum or plasma C reactive protein measurement (mass/v

olume)           1.33 

mg/dL                                   0.00-0.50        



                                                                



Encounters

      



                ACCT No.           Visit Date/Time           Discharge          

 Status         

             Pt. Type           Provider           Facility           Loc./Unit 

          

Complaint        

 

                [id number]           10/08/2014 16:23:00           10/08/2014 23:59:

59           CLS

                Outpatient           ZHANE DAY DO                           

         

                                                 

 

                [id number]           2014 14:23:00           2014 23:59:

59           CLS

                Outpatient           ZHANE DAY DO ALIYA                           

         

                                                 

 

                [id number]           2014 13:31:00           2014 23:59:

59           CLS

                Outpatient           ROXANN CONWAY                  

         

                                                 

 

                [id number]           04/15/2014 15:28:00           04/15/2014 23:59:

59           CLS

                Outpatient           [facility]                       

         

                                                 

 

                [id number]           2014 12:10:00           2014 23:59:

59           CLS

                Outpatient           [facility] CONWAY                  

         

                                                 

 

                [id number]           2014 11:31:00           2014 23:59:

59           CLS

                Outpatient           ZHANE DAY DO                           

         

                                                 

 

                [id number]           2013 13:40:00           2013 23:59:

59           CLS

                    Outpatient           [facility]          

         

                                                             

 

                [id number]           2013 11:24:00           2013 23:59:

59           CLS

                Outpatient           ZHANE DAY DO                           

         

                                                 

 

             [id number]           2013 13:04:00                               

      Document 

Registration                                                                    

 

             [id number]           2016 07:05:00                         

            

Document Registration                                                           

         

 

             [id number]           2016 08:35:00                         

            

Document Registration                                                           

         

 

             [id number]           2016 07:05:00                         

            

Document Registration                                                           

         

 

                    [id number]           2017 09:42:00            10:46:00        

                DIS             Outpatient           WILFREDO CHAMPAGNE APRN      

     [facility] 

[facility]           REHAB                     NECK PAIN      

  

 

                    [id number]           2017 16:44:00            20:07:00        

                DIS             Emergency           LUCERO MACDONALD APRN         

  [facility]           ER                        RT SIDED ABD PAIN      

  

 

                    [id number]           2017 14:36:00            23:59:59        

                CLS             Outpatient           DARON ODONNELL APRN    

       [facility] 

[facility]           RAD                       R10.823 RIGHT L

OWER 

[facility]        

 

                    [id number]           2017 18:45:00            21:30:00        

                DIS             Emergency           DORIS RIDLEY           [facility]

 [facility]           ER                        HEADACHE        

 

                    [id number]           10/23/2017 11:10:00           10/23/2

017 23:59:59        

                CLS             Preadmit           CHAMPAGNE, WILFREDO R APRN        

   [facility]           RAD                       POSTCONCUSSIVE SYNDROME

 F07.81     

   

 

                    [id number]           10/23/2017 11:07:00           10/23/2

017 23:59:59        

                CLS             Preadmit           WILFREDO CHAMPAGNE ANUP APRN        

   [facility]           RAD                       POSTCONCUSSIVE SYNDROME

 F07.81     

   

 

                    [id number]           2017 10:56:00           

017 15:18:00        

                DIS             Outpatient           AP ROWE MD           

[facility]           ENDO                      ABD PAIN; N/V        

 

                    [id number]           2017 05:43:00           

017 23:59:59        

                CLS             Outpatient           AP ROWE MD           

[facility]           PREOP                     ABD PAIN; N/V        

 

                    [id number]           2017 13:03:00           

017 23:59:59        

                CLS             Outpatient           AP ROWE MD           

[facility]           RAD                       ABD PAIN        

 

                    [id number]           2017 07:47:00           

017 12:25:00        

                DIS             Outpatient           AP ROWE MD           

[facility]           SDC                       DYSKNESIA        

 

                    [id number]           2017 08:28:00           

017 08:50:00        

                DIS             Outpatient           AP ROWE MD           

[facility]           PREOP                     DYSKNESIA        

 

                    [id number]           2016 07:03:00           

016 23:59:59        

                CLS             Outpatient           HUSSEIN ARIZA   

        [facility] 

[facility]           CARD                      GENERALIZED ABD

 PAIN      

  

 

                    [id number]           2016 13:43:00           

016 23:59:59        

                CLS             Outpatient           HUSSEIN ARIZA ARNP   

        [facility] 

[facility]           RAD                       PELVIC PAIN    

    

 

                    [id number]           2016 09:33:00           

016 23:59:59        

                CLS             Outpatient           HUSSEIN ARIZA ARNP   

        [facility] 

[facility]           RAD                       PELVIC PAIN    

    

 

                    [id number]           2015 12:52:00           

015 23:59:59        

                CLS             Outpatient           HUSSEIN ARIZA   

        [facility] 

[facility]           RAD                       PAIN OF RIGHT T

HUMB        

 

             [id number]           2020 15:27:00                        A

CT           

Emergency                 LUCERO MACDONALD           [facility]                 ER                        ABD PAIN, RECTAL BLEEDING   

     

 

             [id number]           2011 06:00:00                         

            

Document Registration                                                           

         

 

             [id number]           10/25/2017 14:09:00                         

            

Document Registration                                                           

         

 

             [id number]           2016 10:05:00                         

            

Document Registration                                                           

         

 

             [id number]           2016 18:05:00                         

            

Document Registration                                                           

         

 

                [id number]           2020 15:30:00           2020 23:59:5

9           CLS 

                Outpatient           CRUZITO GRANT                           [facility] 

                                                 

 

             [id number]           2019 17:10:00                              

       Document

 Registration                                                                   

 

 

             [id number]           10/18/2018 09:00:00                              

       Document

 Registration                                                                   

 

 

             [id number]           2018 11:05:00                              

       Document

 Registration                                                                   

 

 

             [id number]           10/24/2017 09:00:00                              

       Document

 Registration
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
## 2020-06-19 NOTE — XMS REPORT
[facility]

                             Created on: 2020



Courtney Grayson

External Reference #: [id number]

: 1980

Sex: Female



Demographics





                          Address                   [address] [place]  [address]

 

                          Preferred Language        Unknown

 

                          Marital Status            Unknown

 

                          Hoahaoism Affiliation     Unknown

 

                          Race                      Unknown

 

                          Ethnic Group              Unknown





Author





                          Author                    Courtney HOWARD

 

                          Organization              [facility]

 

                          Address                   [address] [place]  [address]



 

                          Phone                     [phone]







Care Team Providers





                    Care Team Member Name Role                Phone

 

                    ROXANN HOWARD Unavailable         [phone]







PROBLEMS





          Type      Condition ICD9-CM Code NOT38-ND Code Onset Dates Condition S

tatus SNOMED 

Code

 

          Problem   Hypercholesteremia           E78.00              Active    1

0562022

 

          Problem   Anxiety             F41.9               Active    53715094

 

          Problem   Elevated LDL cholesterol level           E78.00             

 Active    123323453

 

          Problem   Irregular heartbeat           I49.9               Active    

[id number]

 

           Problem    Irritable bowel syndrome with both constipation and diarrh

ea            K58.2                 

Active                                  39006286

 

          Problem   Primary insomnia           F51.01              Active    397

2004

 

          Problem   Rhinosinusitis           J32.9               Active    39158

4004

 

          Problem   Postconcussive syndrome           F07.81              Active

    05770236

 

           Problem    Moderate episode of recurrent major depressive disorder   

         F33.1                 Active

                                        130279467

 

          Problem   Essential hypertension           I10                 Active 

   21030362

 

          Problem   Overweight (BMI 25.0-29.9)           E66.3               Act

britany    349680661

 

          Problem   Seasonal allergic rhinitis due to pollen           J30.1    

           Active    32052683

 

                Problem         Migraine without aura and without status migrain

osus, not intractable                 

G43.009                                 Active              495768999

 

                Problem         Migraine without aura and without status migrain

osus, not intractable                 

G43.009                                 Active              [id number]







ALLERGIES

No Information



ENCOUNTERS





                Encounter       Location        Date            Diagnosis

 

                          [facility]  [address] [facility] [id number]

[address] [place] [address] 

[address]                              Viral illness B34.9 and Flu-

like symptoms R68.89

 

                [facility]     [address] [facility] [id number] [place] [address]

     

Moderate episode of recurrent major depressive disorder F33.1 and Anxiety F41.9

 

                          [facility]  [address] [facility] [id number]

[address] [place] [address] 

[address]                              Influenza A J10.1

 

                          [facility] WALK IN [facility]  [address]

[address] [place] [address] 

[address]                              Flu-like symptoms R68.89

 

                [facility]     [id number] [place] [address]

     

Moderate episode of recurrent major depressive disorder F33.1 and Anxiety F41.9

 

                [facility]     [address] [address]

 30 Dec, 2019    

Moderate episode of recurrent major depressive disorder F33.1 and Anxiety F41.9

 

                [facility]     [address] [address]

 18 Dec, 2019    

Anxiety F41.9 and Moderate episode of recurrent major depressive disorder F33.1

 

                    [place] [address] [address]                                Anxiety F41.9 and Rhinosinusitis J32.9

 

                          [facility] WALK IN [place] [address] 

[address]                              Sore throat J02.9

 

                    [place] [address] [address] 11 

Oct, 2019                               Encounter for immunization Z23

 

                    [place] [address] [address] 13 

Sep, 2019                               Migraine without aura and without status

 migrainosus, not intractable 

G43.009 ; Anxiety F41.9 and Essential hypertension I10

 

                          [facility] IN [place] [address] [place] [address] [id number]

[address] [place] [address] 

[address]                11 Sep, 2019              Migraine without aura and wi

thout status migrainosus, 

not intractable G43.009

 

                          [facility] WALK IN [place] [id number]

[address] [place] [address] 

[address]                27 Aug, 2019              Acute nonintractable headach

e, unspecified headache type

R51

 

                          [facility] WALK IN [address] 

[address]                26 Aug, 2019              Acute intractable headache, 

unspecified headache type 

R51

 

                          [facility] IN [facility]  [id number]

[address] [place] [address] 

[address]                              Dysuria R30.0

 

                    [place] [address] [address]                                Essential hypertension I10

 

                    [address] [address] 28 

Mar, 2019                               Anxiety F41.9

 

                    [place] [address] [address] 19 

Mar, 2019                               Anxiety F41.9 and Encounter for initial 

prescription of contraceptive 

pills Z30.011

 

                    [address] [address] 08 

2018                               Anxiety F41.9

 

                    [address] [address]                                Anxiety F41.9

 

                    [place] [address] [address] 29 

Oct, 2018                               Allergic rhinitis, unspecified J30.9

 

                    [address] [address] 24 

Oct, 2018                               Primary insomnia F51.01

 

                          [facility] IN [place] [id number]

[address] [place] [address] 

[address]                21 Oct, 2018              Lower abdominal pain R10.30 

; Sensation of pressure in 

bladder area R39.89 and Acute right-sided low back pain without sciatica M54.5

 

                    [address] [address] 18 

Oct, 2018                               Screening for diabetes mellitus Z13.1 ; 

Screening for thyroid disorder

Z13.29 ; Screening for hyperlipidemia Z13.220 ; Primary insomnia F51.01 ; 
Anxiety F41.9 and Irritable bowel syndrome with both constipation and diarrhea 
K58.2

 

                    [address] [address] 08 

Oct, 2018                               Encounter for immunization Z23

 

                          [facility] IN CARE  [address] [place] [address] 

[address]                21 Sep, 2018              Left upper quadrant pain R10

.12 and Family history of 

nephrolithiasis Z84.1

 

                          [facility] WALK IN [address] 

[address]                17 Sep, 2018              Left upper quadrant pain R10

.12 ; Acute cystitis without

hematuria N30.00 and Constipation, unspecified constipation type K59.00

 

                    [address] [address] 11 

Sep, 2018                               Seasonal allergic rhinitis due to pollen

 J30.1

 

                          [facility] IN [address] 

[address]                07 Sep, 2018               

 

                          [facility] W[facility] IN [address] 

[address]                04 Sep, 2018              Allergic rhinitis, unspecifi

ed J30.9 and Cough R05

 

                          [facility] IN [address] 

[address]                03 Aug, 2018              Sore throat J02.9 ; Allergic

 rhinitis, unspecified J30.9

; Post-nasal drainage R09.82 ; Other viral agents as the cause of diseases 
classified elsewhere B97.89 and Acute pharyngitis due to other specified 
organisms J02.8

 

                    [address] [address] 10 

Jul, 2018                               Primary insomnia F51.01

 

                    [address] [address]                                 

 

                    [address] [address]                                 

 

                    [address] [address] 24 

May, 2018                               Anxiety F41.9 ; Hypercholesteremia E78.0

0 ; Irritable bowel syndrome 

with both constipation and diarrhea K58.2 ; Primary insomnia F51.01 ; Overweight
(BMI 25.0-29.9) E66.3 and Essential hypertension I10

 

                    [place] [address] [id number] [place] [address] 22 

May, 2018                                

 

                    [address] [address] 08 

Mar, 2018                                

 

                          [facility]  [address] [place], [place] [id number]

[address] [place] [address] 

[address]                08 Mar, 2018              Acute atopic conjunctivitis,

 bilateral H10.13 and 

Allergic rhinitis, unspecified J30.9

 

                    [address] [address] 29 

2018                               Anxiety F41.9 ; Hospital discharge follo

w-up Z09 ; Irritable bowel 

syndrome with both constipation and diarrhea K58.2 and Other insomnia G47.09

 

                          [facility] W[facility] IN [address]

[address] [place] [address] 

[address]                16 2018              Body aches R52 and Influenza

 B J10.1

 

                    [place] [address]  [id number] [id number] 21 Dec, 

2017                                     

 

                          [facility] IN [place] [id number]

[address] [place] [address] 

[address]                20 Dec, 2017              Right lower quadrant abdomin

al tenderness with rebound 

tenderness R10.823

 

                    [address] [address]                                Hospital discharge follow-up Z09 ; Postc

oncussive syndrome F07.81 ; 

Essential hypertension I10 ; Hypercholesteremia E78.00 ; Cervical spine pain 
M54.2 and Irregular heartbeat I49.9

 

                    [address] [address] 25 

Oct, 2017                               Postconcussive syndrome F07.81 and Whipl

tristan injury to neck, initial 

encounter S13.4XXA

 

                    [address] [address] 24 

Oct, 2017                               Encounter to establish care Z76.89 ; Pos

tconcussive syndrome F07.81 

and Essential hypertension I10

 

                    [facility] [address] [address] 20 

Oct, 2017                               Encounter to establish care Z76.89 ; Pos

tconcussive syndrome F07.81 

and Essential hypertension I10

 

                          [facility]  [address] [place] [address] 

[address]                04 Oct, 2017              Postconcussion syndrome F07.

81 and Whiplash injury to 

neck, initial encounter S13.4XXA

 

                          [facility]  [address] [place] [address] 

[address]                28 Sep, 2017              Whiplash injury to neck, sub

sequent encounter S13.4XXD

 

                          [facility]  [address] [place] [address] 

[address]                13 Sep, 2017              Whiplash injury to neck, ini

tial encounter S13.4XXA ; 

Cervicalgia M54.2 and Nausea R11.0

 

                    [address] [address] 12 

Sep, 2017                               Encounter for immunization Z23

 

                          [facility]  [address] [place] [address] 

[address]                02 Sep, 2017              Sore throat J02.9 and Exposu

re to strep throat Z20.818

 

                    [address] [address] 14 

Aug, 2017                               Anxiety F41.9 ; HTN (hypertension) I10 a

nd Irritable bowel syndrome 

with both constipation and diarrhea K58.2

 

                    [address] [address] 10 

Aug, 2017                               HTN (hypertension) I10 ; Anxiety F41.9 ;

 Irritable bowel syndrome with

both constipation and diarrhea K58.2 and Environmental allergies Z91.09

 

                    [address] [address]                                Anxiety F41.9

 

                          [facility]  [address] [place] [address] 

[address]                17 2017              Sore throat J02.9

 

                          [facility]  [facility] [address] [id number]

[address] [place] [address] 

[address]                10 2017              Sore throat J02.9 and Strep 

throat J02.0

 

                    [place] [address] [address] 02 

Mar, 2017                               Dysuria R30.0 ; HTN (hypertension) I10 ;

 Generalized abdominal pain 

R10.84 and Anxiety F41.9

 

                    [place] [address] [address]                                Anxiety F41.9

 

                    [place] [address] [address] 14 

Dec, 2016                                

 

                          [facility] [place] [address] 

[address]                08 Dec, 2016              Dysuria R30.0 and Lower abdo

vilma pain R10.30

 

                    [place] [address] [address] 05 

Dec, 2016                                

 

                    [place] [address] [address] 01 

Dec, 2016                               Dysuria R30.0 ; HTN (hypertension) I10 a

nd Anxiety F41.9

 

                    [place] [address] [address]                                 

 

                          [facility] W[facility] IN [address] 

[address]                               

 

                          [facility] IN [address] 

[address]                              Pelvic pain R10.2 and Candid

al skin infection B37.2

 

                    [place] [address] [address]                                 

 

                    [place] [address] [address]                                 

 

                          [facility] W[facility] IN [address] 

[address]                              Urinary tract infection N39.

0

 

                    [facility] [address] [address]                                 

 

                    [facility] [address] [address]                                 

 

                    [facility] [address] [address]                                 

 

                    [facility] [address] [address]                                 

 

                    [facility] [address] [address]                                Dysuria R30.0 and Acute cystitis without

 hematuria N30.00

 

                    [place] [address] [address] 13 

Oct, 2016                               Anxiety F41.9 and HTN (hypertension) I10

 

                          [facility] IN [facility]  [address] [facility] [id number]

[address] 

[address]                09 Sep, 2016              Acute non-recurrent maxillar

y sinusitis J01.00 and 

Allergic rhinitis, unspecified allergic rhinitis trigger, unspecified rhinitis 
seasonality J30.9

 

                    [place] [address] [address] 29 

Aug, 2016                               HTN (hypertension) I10 and Anxiety F41.9

 

                    [place] [address] [address]                                 

 

                    [place] [address] [address]                                HTN (hypertension) I10

 

                [facility] [id number] [place] [address]

 26 May, 2016     

 

                    [place] [address] [address] 24 

May, 2016                               Anxiety F41.9 and HTN (hypertension) I10

 

                    [place] [address] [address] 02 

May, 2016                               Anxiety F41.9 ; HTN (hypertension) I10 a

nd Environmental allergies 

Z91.09

 

                    [facility] [id number] [place] [address]                                 

 

                          [facility]  [address] [facility] [id number]

[address] [place] 

[address]                16 2016              Elevated blood pressure (not

 hypertension) R03.0 and 

Acute anxiety F41.9

 

                    [facility] [address] [address] 29 

Oct, 2015                               Allergic rhinitis J30.9 and Laryngitis J

04.0

 

                    [facility] [address] [address] 13 

Oct, 2015                               Encounter for immunization Z23

 

                    [facility] [address] [address] 29 

Sep, 2015                               Sore throat 462

 

                    [facility] [address] [address] 08 

Aug, 2015                               Pain of right thumb 729.5

 

                    [facility] [address] [address]                                 

 

                    [facility] [address] [address]                                 

 

                    [facility] [address] [address] 08 

Oct, 2014                                

 

                    [facility] [address] [address] 08 

Oct, 2014                                

 

                    [facility] [address] [address] 22 

Aug, 2014                                

 

                    [facility] [address] [address] 22 

Aug, 2014                                

 

                    [facility] [address] [address] 21 

Aug, 2014                                

 

                    [facility] [address] [address] 21 

Aug, 2014                                

 

                    [facility] [address] [address]                                 

 

                    [facility] [address] [id number] [place] [address]                                 

 

                    [facility] [address] [id number] [place] [address] 15 

Apr, 2014                                

 

                    [facility] [address] [id number] [place] [address] 15 

Apr, 2014                                

 

                    [facility] [address] [id number] [place] [address] 20 

Mar, 2014                                

 

                    [facility] [address] [facility] [id number] [place] [address] 20 

Mar, 2014                                

 

                    [facility] [address] [id number] [place] [address]                                 

 

                    [facility] [address] [id number] [place] [address]                                 

 

                    [facility] [address] [id number] [place] [address] 30 

Dec, 2013                                

 

                    [facility] [address] [id number] [place] [address] 30 

Dec, 2013                                

 

                    [facility] [address] [id number] [place] [address] 25 

Sep, 2013                                

 

                    [facility] [address] [id number] [place] [address] 22 

Aug, 2013                                

 

                    [facility] [address] [id number] [place] [address] 16 

Aug, 2013                                

 

                    [facility] [address] [id number] [place] [address]                                 

 

                    [facility] [address] [id number] [place] [address] 16 

Oct, 2012                                

 

                    [facility] [address] [facility] [place] [address] 16 

Oct, 2012                                

 

                    [facility] [address] [id number] [place] [address]                                 

 

                    [facility] [address] [facility] [place] [address]                                 

 

                    [facility] [address] [id number] [place] [address]                                 

 

                    [facility] [address] [facility] [place] [address] 21 

Dec, 2009                                

 

                    [facility] [address] [facility] [place] [address]                                 







IMMUNIZATIONS

No Known Immunizations



SOCIAL HISTORY

Never Assessed



REASON FOR VISIT





PLAN OF CARE





VITAL SIGNS





                    Height              68 in               2014

 

                    Weight              153.7 lbs           2014

 

                    Temperature         98.2 degrees Fahrenheit 2014

 

                    Heart Rate          80 bpm              2014

 

                    Respiratory Rate    20                  2014

 

                    Blood pressure systolic 112 mmHg            2014

 

                    Blood pressure diastolic 68 mmHg             2014







MEDICATIONS

No Known Medications



RESULTS

No Results



PROCEDURES

No Known procedures



INSTRUCTIONS





MEDICATIONS ADMINISTERED

No Known Medications



MEDICAL (GENERAL) HISTORY





                    Type                Description         Date

 

                    Medical History     Anxiety              

 

                    Medical History     hypertension         

 

                    Medical History     gastroenteritis      

 

                    Medical History     IBS                  

 

                    Surgical History    wisdom teeth extraction  

 

                    Surgical History    cholecsytectomy     2017

 

                    Surgical History    Colonoscopy, EGD    2017

 

                    Hospitalization History child birth          

 

                    Hospitalization History Possible appendicitis 2017

## 2020-06-19 NOTE — XMS REPORT
[facility]

                             Created on: 2020



Courtney Grayson

External Reference #: [id number]

: 1980

Sex: Female



Demographics





                          Address                   [address] [place]  [address]

 

                          Preferred Language        Unknown

 

                          Marital Status            Unknown

 

                          Alevism Affiliation     Unknown

 

                          Race                      Unknown

 

                          Ethnic Group              Unknown





Author





                          Author                    Courtney GRANT

 

                          Organization              [facility]

 

                          Address                   [address] [place]  [address]



 

                          Phone                     [phone]







Care Team Providers





                    Care Team Member Name Role                Phone

 

                    KING  CRUZITO       Unavailable         [phone]







PROBLEMS





          Type      Condition ICD9-CM Code WWQ71-VJ Code Onset Dates Condition S

tatus SNOMED 

Code

 

          Problem   Hypercholesteremia           E78.00              Active    1

4305393

 

          Problem   Anxiety             F41.9               Active    01386023

 

          Problem   Elevated LDL cholesterol level           E78.00             

 Active    011481020

 

          Problem   Irregular heartbeat           I49.9               Active    

441245245

 

           Problem    Irritable bowel syndrome with both constipation and diarrh

ea            K58.2                 

Active                                  41897531

 

          Problem   Primary insomnia           F51.01              Active    397

2004

 

          Problem   Rhinosinusitis           J32.9               Active    96355

4004

 

          Problem   Postconcussive syndrome           F07.81              Active

    93900071

 

           Problem    Moderate episode of recurrent major depressive disorder   

         F33.1                 Active

                                        285631560

 

          Problem   Essential hypertension           I10                 Active 

   74230983

 

          Problem   Overweight (BMI 25.0-29.9)           E66.3               Act

britany    291804592

 

          Problem   Seasonal allergic rhinitis due to pollen           J30.1    

           Active    76695889

 

                Problem         Migraine without aura and without status migrain

osus, not intractable                 

G43.009                                 Active              335505286

 

                Problem         Migraine without aura and without status migrain

osus, not intractable                 

G43.009                                 Active              122131777







ALLERGIES

No Information



ENCOUNTERS





                Encounter       Location        Date            Diagnosis

 

                [facility]     [address] [facility] [facility] [address]

 14 2020     

 

                          [facility]  [address]

[address] [place] [address] 

[address]                              Influenza A J10.1

 

                          [facility]  [address] [facility] [id number]

[address] [place] [address] 

[address]                              Flu-like symptoms R68.89

 

                [facility]     [id number] [place] [address]

     

Moderate episode of recurrent major depressive disorder F33.1 and Anxiety F41.9

 

                [facility]     [address] [facility] [facility] [address]

 30 Dec, 2019    

Moderate episode of recurrent major depressive disorder F33.1 and Anxiety F41.9

 

                [facility]     [address] [facility] [facility] [address]

 18 Dec, 2019    

Anxiety F41.9 and Moderate episode of recurrent major depressive disorder F33.1

 

                    [place] [address] [address]                                Anxiety F41.9 and Rhinosinusitis J32.9

 

                          [facility] [place] [address] 

[address]                              Sore throat J02.9

 

                    [place] [address] [address] 11 

Oct, 2019                               Encounter for immunization Z23

 

                    [place] [address] [address] 13 

Sep, 2019                               Migraine without aura and without status

 migrainosus, not intractable 

G43.009 ; Anxiety F41.9 and Essential hypertension I10

 

                          [facility] [place] [address] 

[address]                11 Sep, 2019              Migraine without aura and wi

thout status migrainosus, 

not intractable G43.009

 

                          [facility]  [id number]

[address] [place] [address] 

[address]                27 Aug, 2019              Acute nonintractable headach

e, unspecified headache type

R51

 

                          [facility] [place] [address] 

[address]                26 Aug, 2019              Acute intractable headache, 

unspecified headache type 

R51

 

                          [facility] [place] [address] 

[address]                              Dysuria R30.0

 

                    [place] [address] [address]                                Essential hypertension I10

 

                    [place] [address] [address] 28 

Mar, 2019                               Anxiety F41.9

 

                    [place] [address] [address] 19 

Mar, 2019                               Anxiety F41.9 and Encounter for initial 

prescription of contraceptive 

pills Z30.011

 

                    [place] [address] [address]                                Anxiety F41.9

 

                    [place] [address] [address]                                Anxiety F41.9

 

                    [place] [address] [address] 29 

Oct, 2018                               Allergic rhinitis, unspecified J30.9

 

                    [address] [address] 24 

Oct, 2018                               Primary insomnia F51.01

 

                          [facility]  [address] [place], [place] [id number]

[address] [place] [address] 

[address]                21 Oct, 2018              Lower abdominal pain R10.30 

; Sensation of pressure in 

bladder area R39.89 and Acute right-sided low back pain without sciatica M54.5

 

                    [address] [address] 18 

Oct, 2018                               Screening for diabetes mellitus Z13.1 ; 

Screening for thyroid disorder

Z13.29 ; Screening for hyperlipidemia Z13.220 ; Primary insomnia F51.01 ; 
Anxiety F41.9 and Irritable bowel syndrome with both constipation and diarrhea 
K58.2

 

                    [address] [address] 08 

Oct, 2018                               Encounter for immunization Z23

 

                          [facility]  [address] [place], [place] [id number]

[address] [place] [address] 

[address]                21 Sep, 2018              Left upper quadrant pain R10

.12 and Family history of 

nephrolithiasis Z84.1

 

                          [facility] [address] 

[address]                17 Sep, 2018              Left upper quadrant pain R10

.12 ; Acute cystitis without

hematuria N30.00 and Constipation, unspecified constipation type K59.00

 

                    [place] [address] [address] 11 

Sep, 2018                               Seasonal allergic rhinitis due to pollen

 J30.1

 

                          [facility] [address]

[address] [place] [address] 

[address]                07 Sep, 2018               

 

                          [facility] [address] [place] [address] 

[address]                04 Sep, 2018              Allergic rhinitis, unspecifi

ed J30.9 and Cough R05

 

                          [facility] IN [address] 

[address]                03 Aug, 2018              Sore throat J02.9 ; Allergic

 rhinitis, unspecified J30.9

; Post-nasal drainage R09.82 ; Other viral agents as the cause of diseases 
classified elsewhere B97.89 and Acute pharyngitis due to other specified 
organisms J02.8

 

                    [place] [address] [address] 10 

Jul, 2018                               Primary insomnia F51.01

 

                    [place] [address] [address]                                 

 

                    [place] [address] [address]                                 

 

                    [place] [address] [address] 24 

May, 2018                               Anxiety F41.9 ; Hypercholesteremia E78.0

0 ; Irritable bowel syndrome 

with both constipation and diarrhea K58.2 ; Primary insomnia F51.01 ; Overweight
(BMI 25.0-29.9) E66.3 and Essential hypertension I10

 

                    [place] [address] [address] 22 

May, 2018                                

 

                    [place] [address] [address] 08 

Mar, 2018                                

 

                          [facility]  [address] [place], [place] [address]

[address] [place] [address] 

[address]                08 Mar, 2018              Acute atopic conjunctivitis,

 bilateral H10.13 and 

Allergic rhinitis, unspecified J30.9

 

                    [address] [address] 29 

2018                               Anxiety F41.9 ; Hospital discharge follo

w-up Z09 ; Irritable bowel 

syndrome with both constipation and diarrhea K58.2 and Other insomnia G47.09

 

                          [facility] IN [address] 

[address]                16 2018              Body aches R52 and Influenza

 B J10.1

 

                    [address], [place] [address] 21 Dec, 

2017                                     

 

                          [facility] IN [address] 

[address]                20 Dec, 2017              Right lower quadrant abdomin

al tenderness with rebound 

tenderness R10.823

 

                    [address] [address]                                Hospital discharge follow-up Z09 ; Postc

oncussive syndrome F07.81 ; 

Essential hypertension I10 ; Hypercholesteremia E78.00 ; Cervical spine pain 
M54.2 and Irregular heartbeat I49.9

 

                    [address] [address] 25 

Oct, 2017                               Postconcussive syndrome F07.81 and Whipl

tristan injury to neck, initial 

encounter S13.4XXA

 

                    [address] [address] 24 

Oct, 2017                               Encounter to establish care Z76.89 ; Pos

tconcussive syndrome F07.81 

and Essential hypertension I10

 

                    [address] [address] 20 

Oct, 2017                               Encounter to establish care Z76.89 ; Pos

tconcussive syndrome F07.81 

and Essential hypertension I10

 

                          [facility] [address] 

[address]                04 Oct, 2017              Postconcussion syndrome F07.

81 and Whiplash injury to 

neck, initial encounter S13.4XXA

 

                          [facility]  [address] 

[address]                28 Sep, 2017              Whiplash injury to neck, sub

sequent encounter S13.4XXD

 

                          [facility]  [facility] [address] 

[address]                13 Sep, 2017              Whiplash injury to neck, ini

tial encounter S13.4XXA ; 

Cervicalgia M54.2 and Nausea R11.0

 

                    [place] [address] [address] 12 

Sep, 2017                               Encounter for immunization Z23

 

                          [facility]  [facility] [address] 

[address]                02 Sep, 2017              Sore throat J02.9 and Exposu

re to strep throat Z20.818

 

                    [place] [address] [address] 14 

Aug, 2017                               Anxiety F41.9 ; HTN (hypertension) I10 a

nd Irritable bowel syndrome 

with both constipation and diarrhea K58.2

 

                    [place] [address] [address] 10 

Aug, 2017                               HTN (hypertension) I10 ; Anxiety F41.9 ;

 Irritable bowel syndrome with

both constipation and diarrhea K58.2 and Environmental allergies Z91.09

 

                    [place] [address] [address]                                Anxiety F41.9

 

                          [facility]  [address] 

[address]                              Sore throat J02.9

 

                          [facility]  [facility] [address] 

[address]                10 2017              Sore throat J02.9 and Strep 

throat J02.0

 

                    [place] [address] [address] 02 

Mar, 2017                               Dysuria R30.0 ; HTN (hypertension) I10 ;

 Generalized abdominal pain 

R10.84 and Anxiety F41.9

 

                    [facility] [address] [address]                                Anxiety F41.9

 

                    [facility] [address] [address] 14 

Dec, 2016                                

 

                          [facility]  [address] 

[address]                08 Dec, 2016              Dysuria R30.0 and Lower abdo

vilma pain R10.30

 

                    [place] [address] [address] 05 

Dec, 2016                                

 

                    [place] [address] [address] 01 

Dec, 2016                               Dysuria R30.0 ; HTN (hypertension) I10 a

nd Anxiety F41.9

 

                    [place] [address] [address]                                 

 

                          [facility]  [address] 

[address]                               

 

                          [facility] [place] [address] 

[address]                              Pelvic pain R10.2 and Candid

al skin infection B37.2

 

                    [place] [address] [address]                                 

 

                    [place] [address] [address]                                 

 

                          [facility]  [facility] [address] 

[address]                              Urinary tract infection N39.

0

 

                    [place] [address] [address]                                 

 

                    [place] [address] [address]                                 

 

                    [place] [address] [address]                                 

 

                    [place] [address] [address]                                 

 

                    [place] [address] [address]                                Dysuria R30.0 and Acute cystitis without

 hematuria N30.00

 

                    [place] [address] [address] 13 

Oct, 2016                               Anxiety F41.9 and HTN (hypertension) I10

 

                          [facility]  [address] 

[address]                09 Sep, 2016              Acute non-recurrent maxillar

y sinusitis J01.00 and 

Allergic rhinitis, unspecified allergic rhinitis trigger, unspecified rhinitis 
seasonality J30.9

 

                    [place] [address] [address] 29 

Aug, 2016                               HTN (hypertension) I10 and Anxiety F41.9

 

                    [place] [address] [address]                                 

 

                    [place] [address] [address]                                HTN (hypertension) I10

 

                [facility]  [id number] [place] [address]

 26 May, 2016     

 

                    [place] [address] [address] 24 

May, 2016                               Anxiety F41.9 and HTN (hypertension) I10

 

                    [place] [address] [address] 02 

May, 2016                               Anxiety F41.9 ; HTN (hypertension) I10 a

nd Environmental allergies 

Z91.09

 

                    [place] [address] [address]                                 

 

                          [facility]  [address] [id number]

[address] [place] [address] 

[address]                              Elevated blood pressure (not

 hypertension) R03.0 and 

Acute anxiety F41.9

 

                    [facility] [address] [address] 29 

Oct, 2015                               Allergic rhinitis J30.9 and Laryngitis J

04.0

 

                    [facility] [address] [address] 13 

Oct, 2015                               Encounter for immunization Z23

 

                    [facility] [address] [address] 29 

Sep, 2015                               Sore throat 462

 

                    [facility] [address] [address] 08 

Aug, 2015                               Pain of right thumb 729.5

 

                    [facility] [address] [address] 14 

2015                                

 

                    [facility] [address] [address]                                 

 

                    [facility] [address] [address] 08 

Oct, 2014                                

 

                    [facility] [address] [address] 08 

Oct, 2014                                

 

                    [facility] [address] [address] 22 

Aug, 2014                                

 

                    [facility] [address] [address] 22 

Aug, 2014                                

 

                    [facility] [address] [address] 21 

Aug, 2014                                

 

                    [facility] [address] [address] 21 

Aug, 2014                                

 

                    [facility] [address] [address]                                 

 

                    [facility] [address] [address]                                 

 

                    [facility] [address] [address] 15 

Apr, 2014                                

 

                    [facility] [address] [facility] [place] [address] 15 

Apr, 2014                                

 

                    [facility] [address] [facility] [place] [address] 20 

Mar, 2014                                

 

                    [facility] [address] [facility] [place] [address] 20 

Mar, 2014                                

 

                    [facility] [address] [facility] [place] [address]                                 

 

                    [facility] [address] [facility] [place] [address]                                 

 

                    [facility] [address] [facility] [place] [address] 30 

Dec, 2013                                

 

                    [facility] [address] [facility] [place] [address] 30 

Dec, 2013                                

 

                    [facility] [address] [place] [id number] [place] [address] 25 

Sep, 2013                                

 

                    [facility] [address] [facility] [place] [address] 22 

Aug, 2013                                

 

                    [facility] [address] [facility] [place] [address] 16 

Aug, 2013                                

 

                    [facility] [address] [facility] [place] [address]                                 

 

                    [facility] [address] [facility] [place] [address] 16 

Oct, 2012                                

 

                    [facility] [address] [facility] [place] [address] 16 

Oct, 2012                                

 

                    [facility] [address] [facility] [place] [address]                                 

 

                    [facility] [address] [facility] [place] [address]                                 

 

                    [facility] [address] [facility] [place] [address]                                 

 

                    [facility] [address] [facility] [place] [address] 21 

Dec, 2009                                

 

                    [facility] [address] [facility] [id number] [place] [address]                                 







IMMUNIZATIONS

No Known Immunizations



SOCIAL HISTORY

Never Assessed



REASON FOR VISIT

Medication Refill



PLAN OF CARE





VITAL SIGNS





MEDICATIONS





        Medication Instructions Dosage  Frequency Start Date End Date Duration S

nilsa

 

        Escitalopram Oxalate 10 mg Orally Once a day 1 tablet 24h               

      30 days Active







RESULTS

No Results



PROCEDURES

No Known procedures



INSTRUCTIONS





MEDICATIONS ADMINISTERED

No Known Medications



MEDICAL (GENERAL) HISTORY





                    Type                Description         Date

 

                    Medical History     Anxiety              

 

                    Medical History     hypertension         

 

                    Medical History     gastroenteritis      

 

                    Medical History     IBS                  

 

                    Surgical History    wisdom teeth extraction  

 

                    Surgical History    cholecsytectomy     2017

 

                    Surgical History    Colonoscopy, EGD    2017

 

                    Hospitalization History child birth          

 

                    Hospitalization History Possible appendicitis 2017
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
## 2020-06-19 NOTE — ED GI
General


Chief Complaint:  Abdominal/GI Problems


Stated Complaint:  ABD PAIN, RECTAL BLEEDING


Source of Information:  Patient


Exam Limitations:  No Limitations





History of Present Illness


Date Seen by Provider:  Jun 19, 2020


Time Seen by Provider:  15:27


Initial Comments


One day progressively worsening GI bleed with blood clots being passed. No pain 

in the rectum. No dysuria or dyspareunia, chest pain history of anemia, 

shortness of breath. Some mild right-sided abdominal pain and low back pain. 

History of irritable bowel syndrome with a scope by Dr. Darden several years ago 

without one polyp. No early-onset colon cancer in primary family





Allergies and Home Medications


Allergies


Coded Allergies:  


     amoxicillin (Verified  Allergy, Unknown, HAS TAKEN PENICILLIN RECENTLY W/NO

RXN, 6/10/08)





Home Medications


Cetirizine HCl 10 Mg Tablet, 10 MG PO HS, (Reported)


Citalopram Hydrobromide 20 Mg Tablet, 20 MG PO DAILY, (Reported)


Cyclobenzaprine HCl 10 Mg Tablet, 10 MG PO Q8H


   Prescribed by: DORIS RIDLEY on 11/2/17 2112


Dicyclomine HCl 10 Mg Capsule, 10 MG PO QID


   Prescribed by: AP DARDEN on 3/8/17 1434


Dicyclomine HCl 20 Mg Tablet, 20 MG PO Q6H PRN for PAIN-BREAKTHROUGH


   Prescribed by: JEN STONE on 6/19/20 1800


Hydrocodone Bit/Acetaminophen 1 Each Tablet, 1-2 EACH PO q4-6 hrs PRN for PAIN, 

(Reported)


Lisinopril 10 Mg Tablet, 10 MG PO DAILY, (Reported)


Norgestimate-Ethinyl Estradiol 1 Each Tablet, 1 EACH PO DAILY, (Reported)


Ondansetron 8 Mg Tab.rapdis, 8 MG PO Q6H PRN for NAUSEA/VOMITING-1ST LINE


   Prescribed by: LUCERO MACDONALD on 12/21/17 1939


Ondansetron 4 Mg Tab.rapdis, 4 MG PO Q6H PRN for NAUSEA/VOMITING


   Prescribed by: JEN STONE on 6/19/20 1800


Polyethylene Glycol 3350 17 Gm Powd.pack, 17 GM PO BID


   Prescribed by: LUCERO MACDONALD on 12/21/17 1939


Promethazine HCl 25 Mg Tablet, 12.5-25 MG PO Q4H PRN for NAUSEA/VOMITING, 

(Reported)


   TAKE 1/2 TO 1 (25MG) TAB 





Patient Home Medication List


Home Medication List Reviewed:  Yes





Review of Systems


Review of Systems


Constitutional:  No chills, No diaphoresis, No fever


EENTM:  No Blurred Vision, No Double Vision


Respiratory:  Denies Cough, Denies Shortness of Air


Cardiovascular:  Denies Chest Pain, Denies Edema


Gastrointestinal:  See HPI, Abdominal Pain; Denies Constipated, Denies Diarrhea,

Denies Nausea


Genitourinary:  Denies Burning, Denies Discharge


Musculoskeletal:  No back pain, No joint pain


Skin:  No dryness, No lesions


Psychiatric/Neurological:  Denies Headache, Denies Numbness





All Other Systems Reviewed


Negative Unless Noted:  Yes





Past Medical-Social-Family Hx


Patient Social History


Alcohol Use:  Denies Use


Recreational Drug Use:  No


Smoking Status:  Never a Smoker


Type Used:  Cigarettes


Former Smoker, Quit:  Jan 4, 2008


Recent Foreign Travel:  No


Contact w/Someone Who Travel:  No


Recent Hopitalizations:  No


Physical Abuse:  No


Sexual Abuse:  No


Mistreated:  No


Fear:  No





Immunizations Up To Date


Tetanus Booster (TDap):  Unknown





Seasonal Allergies


Seasonal Allergies:  Yes





Past Medical History


Surgeries:  Yes (WISDOM TEETH)


Gallbladder


Respiratory:  No


Cardiac:  No


Neurological:  No


Reproductive Disorders:  No


Genitourinary:  No


Gastrointestinal:  Yes


Polyps, Gall Bladder Disease, Irritable Bowel


Musculoskeletal:  No


Endocrine:  No


HEENT:  No


Loss of Vision:  Bilateral


Hearing Impairment:  Denies


Cancer:  No


Psychosocial:  Yes


Anxiety, Depression


Integumentary:  No


Blood Disorders:  No





Physical Exam


Vital Signs





Vital Signs - First Documented








 6/19/20





 15:44


 


Temp 36.4


 


Pulse 110


 


Resp 20


 


B/P (MAP) 174/114 (134)


 


Pulse Ox 98





Capillary Refill :


Height/Weight/BMI


Height: 5'8.00"


Weight: 165lbs. 9.0oz. 74.099902gs; 21.7 BMI


Method:Stated


General Appearance:  WD/WN, mild distress


HEENT:  PERRL/EOMI, pharynx normal


Neck:  full range of motion, normal inspection


Respiratory:  lungs clear, normal breath sounds, no respiratory distress, no 

accessory muscle use


Cardiovascular:  normal peripheral pulses, regular rate, rhythm


Gastrointestinal:  normal bowel sounds, soft


Extremities:  normal inspection, normal capillary refill


Neurologic/Psychiatric:  alert, normal mood/affect, oriented x 3


Skin:  normal color, warm/dry





Progress/Results/Core Measures


Results/Orders


Lab Results





Laboratory Tests








Test


 6/19/20


15:35 6/19/20


15:57 Range/Units


 


 


Urine Color YELLOW    


 


Urine Clarity CLEAR    


 


Urine pH 6.0   5-9  


 


Urine Specific Gravity 1.020   1.016-1.022  


 


Urine Protein NEGATIVE   NEGATIVE  


 


Urine Glucose (UA) NEGATIVE   NEGATIVE  


 


Urine Ketones NEGATIVE   NEGATIVE  


 


Urine Nitrite NEGATIVE   NEGATIVE  


 


Urine Bilirubin NEGATIVE   NEGATIVE  


 


Urine Urobilinogen 0.2   < = 1.0  MG/DL


 


Urine Leukocyte Esterase TRACE H  NEGATIVE  


 


Urine RBC (Auto) NEGATIVE   NEGATIVE  


 


Urine RBC RARE    /HPF


 


Urine WBC 0-2    /HPF


 


Urine Squamous Epithelial


Cells 2-5 


 


  /HPF





 


Urine Crystals NONE    /LPF


 


Urine Bacteria FEW H   /HPF


 


Urine Casts NONE    /LPF


 


Urine Mucus SMALL H   /LPF


 


Urine Culture Indicated YES    


 


White Blood Count


 


 6.6 


 4.3-11.0


10^3/uL


 


Red Blood Count


 


 4.52 


 4.35-5.85


10^6/uL


 


Hemoglobin  13.3  11.5-16.0  G/DL


 


Hematocrit  39  35-52  %


 


Mean Corpuscular Volume  87  80-99  FL


 


Mean Corpuscular Hemoglobin  29  25-34  PG


 


Mean Corpuscular Hemoglobin


Concent 


 34 


 32-36  G/DL





 


Red Cell Distribution Width  12.7  10.0-14.5  %


 


Platelet Count


 


 315 


 130-400


10^3/uL


 


Mean Platelet Volume  8.8  7.4-10.4  FL


 


Neutrophils (%) (Auto)  59  42-75  %


 


Lymphocytes (%) (Auto)  32  12-44  %


 


Monocytes (%) (Auto)  6  0-12  %


 


Eosinophils (%) (Auto)  3  0-10  %


 


Basophils (%) (Auto)  1  0-10  %


 


Neutrophils # (Auto)  3.9  1.8-7.8  X 10^3


 


Lymphocytes # (Auto)  2.1  1.0-4.0  X 10^3


 


Monocytes # (Auto)  0.4  0.0-1.0  X 10^3


 


Eosinophils # (Auto)


 


 0.2 


 0.0-0.3


10^3/uL


 


Basophils # (Auto)


 


 0.0 


 0.0-0.1


10^3/uL


 


Sodium Level  135  135-145  MMOL/L


 


Potassium Level  4.0  3.6-5.0  MMOL/L


 


Chloride Level  106    MMOL/L


 


Carbon Dioxide Level  19 L 21-32  MMOL/L


 


Anion Gap  10  5-14  MMOL/L


 


Blood Urea Nitrogen  10  7-18  MG/DL


 


Creatinine


 


 0.79 


 0.60-1.30


MG/DL


 


Estimat Glomerular Filtration


Rate 


 > 60 


  





 


BUN/Creatinine Ratio  13   


 


Glucose Level  85    MG/DL


 


Calcium Level  9.0  8.5-10.1  MG/DL


 


Corrected Calcium  8.8  8.5-10.1  MG/DL


 


Total Bilirubin  0.5  0.1-1.0  MG/DL


 


Aspartate Amino Transf


(AST/SGOT) 


 20 


 5-34  U/L





 


Alanine Aminotransferase


(ALT/SGPT) 


 22 


 0-55  U/L





 


Alkaline Phosphatase  88    U/L


 


C-Reactive Protein High


Sensitivity 


 1.33 H


 0.00-0.50


MG/DL


 


Total Protein  7.1  6.4-8.2  GM/DL


 


Albumin  4.2  3.2-4.5  GM/DL








My Orders





Orders - JEN STONE


Ua Culture If Indicated (6/19/20 15:27)


Urine Pregnancy Bedside (6/19/20 15:27)


Cbc With Automated Diff (6/19/20 15:41)


Comprehensive Metabolic Panel (6/19/20 15:41)


Hs C Reactive Protein (6/19/20 15:41)


Urine Culture (6/19/20 15:35)


Dicyclomine Capsule (Bentyl Capsule) (6/19/20 18:00)





Medications Given in ED





Vital Signs/I&O











 6/19/20 6/19/20





 15:44 18:37


 


Temp 36.4 36.4


 


Pulse 110 80


 


Resp 20 20


 


B/P (MAP) 174/114 (134) 132/88 (134)


 


Pulse Ox 98 97











Progress


Progress Note :  


   Time:  17:55


Progress Note


The patient's pain was not improved by Toradol so she was given fentanyl which 

did improve her pain but is starting to wear off again. CT did not demonstrate 

anything but a fibroid. We will give her some Bentyl and if that helps with her 

pain then we'll let her follow up with Dr. Darden for colonoscopy or other 

appropriate management. Patient's in agreement with this plan.





Departure


Impression





   Primary Impression:  


   Abdominal pain


   Qualified Codes:  R10.31 - Right lower quadrant pain


   Additional Impression:  


   Bright red blood per rectum


Disposition:  01 HOME, SELF-CARE


Condition:  Stable





Departure-Patient Inst.


Decision time for Depature:  17:55


Referrals:  


ZHANE DAY DO (PCP)


Primary Care Physician








WILFREDO CHAMPAGNE (Family)


Primary Care Physician








AP DARDEN MD


Patient Instructions:  Bloody Stools, Adult (DC)





Add. Discharge Instructions:  


Monday morning please call Dr. Darden's clinic and request follow-up appointment.


Start taking pantoprazole 20 mg twice a day for the next 4 weeks.


Tylenol 1000 mg every 8 hours as necessary for pain.


Zofran 1 tablet every 6 hours as necessary for nausea.


Bentyl 1 tablet every 6 hours if necessary for abdominal pain.


Please return to the ER promptly if you begin to express chest pain, shortness 

of air or other worrisome symptoms.


All discharge instructions reviewed with patient and/or family. Voiced 

understanding.


Scripts


Ondansetron (Ondansetron Odt) 4 Mg Tab.rapdis


4 MG PO Q6H PRN for NAUSEA/VOMITING, #8 TAB 0 Refills


   Prov: JEN STONE         6/19/20 


Dicyclomine HCl (Dicyclomine HCl) 20 Mg Tablet


20 MG PO Q6H PRN for PAIN-BREAKTHROUGH, #20 TAB 0 Refills


   Prov: JEN STONE         6/19/20





Copy


Copies To 1:   AP DARDEN MD, TITUS J                 Jun 19, 2020 15:47
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
## 2020-06-19 NOTE — XMS REPORT
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   Radiologic exam  no information  no name      [facility]



                           abdomen 1 view            [facility] (71527)

 

    



              2018   Therapeutic  no information  no name      [facility]



                           prophylactic/dx           [facility]



                           injection subq/im         [place] (30122)

 

    



              2019   Urine pregnancy test  no information  no name      Co

[facility]



                           visual color cmprsn       [facility] (62786)

 

    



              2018   Urnls dip stick/tablet  no information  no name      

[facility] hospital



                           rgnt auto w/o             [facility]



                           microscopy                [place] (97068)



                                                                                
                                                                             



Immunizations

                      



    



              Normalized   Immunization Date  Notes        Care Provider  Facili

ty



                                         Immunization    

 

    



              DEPO MEDROL 40 MG/ML  2018   no information  PRADEEP CHAMPAGNE 6

6762  

[facility] (17516)

 

    



              DEXAMETHASONE 4MG/ML  2018   no information  [facility] CHAMP[place]NE 6

6762  

Critical access hospital



                           (PER 1 MG)                [facility] (92595)

 

    



              influenza,   10-   no information  ZHANE DAY 00424  [facility]



                           injectable,               [facility]



                           quadrivalent,             [place] (91339)



                                         preservative free    

 

    



              influenza, seasonal,  10-   no information  no name      Co

[facility]



                           injectable                [facility]



                                         (06635)

 

    



              influenza, seasonal,  10- -  no information  ZHANE 

62  Critical access hospital



                     injectable          10-          [facility]



                           Translations: [           [place] (08306)



                                         SINGLE IMMUNIZATION    



                                         ADMIN]    



                                                                                
                                     



Results

                      



     



            Test Name  Value      Interpretation  Reference Range  Date Time  Fa

cility



                     (Normalized)        (Normalized)        (Medline  



                                         Reference)  

 

 



                                         xray : kub (in



                                         house)



                                         on



                                         null

 

     



                 NEGATED:        no information  (no code)       [facility]

h



                           Highlighted row           Center of



                           Laboratory                [facility]



                           studies (set)             ()

 

 



                                         urinalysis



                                         on null

 

     



              Protein (U)  Negative     (no code)    0 - 20 mg/dL   [facility]



                           [Mass/Vol]                [facility]



                                         ()

 

 



                                         strep a (in



                                         house)



                                         on



                                         null

 

     



                 STREP A (IN     0365814         (no code)       [facility]

h



                           HOUSE)                    [facility]



                                         ()

 

     



                 STREP A (IN     2020 10 16      (no code)       [facility]

h



                           HOUSE)                    [facility]



                                         ()

 

 



                                         pregnancy test,



                                         urine (in house)



                                         on null

 

     



                 HCG (pregnancy  Negative        (no code)       [facility]



                           test) Ql (U)              [facility]



                                         ()

 

     



                 HCG (pregnancy  4646390         (no code)       [facility]

h



                           test) Ql (U)              [facility]



                                         (49440)

 

     



                 HCG (pregnancy  +               (no code)       [facility]

h



                           test) Ql (U)              [facility]



                                         ()

 

     



                 HCG (pregnancy  2020          (no code)       [facility]

h



                           test) Ql (U)              [facility]



                                         (80472)

 

 



                                         other



                                         on



                                         null

 

     



                 BLO             Negative        (no code)       [facility]

[facility]



                                         (20612)

 

     



                 KET             2019~clear~ye  (no code)       [facility]



                           llow~none~negati          [facility]



                           ve~negative~nega          [facility]



                           tive                      (85929)

 

     



                 Lot #           [id number]          (no code)       [facility]

[facility]



                                         (26347)

 

     



                 SG              1.020           (no code)       [facility]

[facility]



                                         (68593)

 

     



                 URO             0.2             (no code)       [facility]

[facility]



                                         (90149)

 

 



                                         not yet



                                         categorized



                                         on 2020

 

     



                 Control         neg~neg~+       (no code)       [facility]

[facility]



                                         (83830)

 

     



                 Exp date        10/2022         (no code)       [facility]

[facility]



                                         (31824)

 

     



                 Lot #           [id number]         (no code)       [facility]

[facility]



                                         (02348)

 

 



                                         not yet



                                         categorized



                                         on 2020

 

     



                 Control         Negative        (no code)       [facility]

[facility]



                                         (68113)

 

     



                 Exp date        10/09/2021      (no code)       [facility]

[facility]



                                         (90143)

 

     



                 Lot #           [id number]         (no code)       [facility]

[facility]



                                         (68798)

 

 



                                         not yet



                                         categorized



                                         on 2020

 

     



                 Control         Negative        (no code)       [facility]

[facility]



                                         (91298)

 

     



                 Exp date                   (no code)       [facility]

[facility]



                                         (20546)

 

     



                 Lot #           [id number]         (no code)       [facility]

[facility]



                                         (19820)

 

 



                                         not yet



                                         categorized



                                         on 2019

 

     



                 Exp date        Negative        (no code)       [facility]

[facility]



                                         (56794)

 

 



                                         urinalysis



                                         on 2019

 

     



                 Bacteria        SEE NOTE        (no code)       [facility]





                           identified Cx             [facility]                   [facility]



                                         (76465)

 

 



                                         other



                                         on



                                         2019

 

     



                 Exp date        +~2020        (no code)       [facility]

[facility]



                                         (49649)

 

     



                 Lot #           [id number]         (no code)       [facility]

[facility]



                                         (42429)

 

     



                 RESULTS         Negative        (no code)       [facility]



                                         (45575)

 

 



                                         urinalysis



                                         on 2018

 

     



                 Bacteria        SEE NOTE        (A)             [facility]





                           identified Cx             [facility]



                           Nom (U)                   [facility]



                                         (63702)

 

     



              Protein mass  Negative     (no code)    0 - 20 mg/dL   [facility]



                           conc (U)                  [facility]



                                         (74744)

 

 



                                         other



                                         on



                                         2018

 

     



                 BLO             2+              (no code)       [facility]

[facility]



                                         (21519)

 

     



                 Exp date        Negative        (no code)       [facility]

[facility]



                                         (41345)

 

     



                 KET             10 31           (no code)       [facility]





                           2018~clear~yello          [facility]



                           w~none~negative~          [facility]



                           negative~negativ          (87614)



                                         e    

 

     



                 Lot #           [id number]          (no code)       [facility]



                                         (14386)

 

     



                 SG              1.020           (no code)       [facility]



                                         (34838)

 

     



                 URO             0.2             (no code)       [facility]



                                         (37408)

 

 



                                         hematology



                                         on 2018

 

     



              pH (Bld)     6.0 [pH]     (no code)    7.38 - 7.42 [pH]   [facility]



                                         (49530)

 

 



                                         other



                                         on



                                         2018

 

     



                 Control         Negative        (no code)       [facility]



                                         (97458)

 

     



                 Exp date        2020 10 16      (no code)       [facility]



                                         (50294)

 

     



                 Lot #           [id number]         (no code)       [facility]



                                         (87186)

 

 



                                         other



                                         on



                                         2018

 

     



                 Control         Negative        (no code)       [facility]



                                         (70573)

 

     



                 Exp date        2020      (no code)       [facility]



                                         (09446)

 

     



                 Lot #           [id number]         (no code)       [facility]



                                         (06153)



                                                                                
                                                                                
                                                                                
                                                                                
                                                                                
                                                                                
        



Vital Signs

                      



      



           Vital Sign  Value     Interpretation  Reference  Date Time  Care Prov

ider  

Facility



                     (Normalized)        (Normalized)        Range   

 

      



           BMI (Body Mass  27.52 kg/m2  (no code)  15 - 25 kg/m2  2019  GEO GRANT 

 Community



                 Index)          12:      [address] [place] [address] (62140)

 

      



           BMI (Body Mass  27.05 kg/m2  (no code)  15 - 25 kg/m2  2018  GEO GRANT 

 Community



                 Index)          11:      [address] [place] [address] (53056)

 

      



           BMI (Body Mass  26.12 kg/m2  (no code)  15 - 25 kg/m2  2018  PA

DALTON  

Community



                 Index)          12:      LEE [address] [place] [address] (56934)

 

      



           BMI (Body Mass  25.94 kg/m2  (no code)  15 - 25 kg/m2  2018  ANGELA

CYN JOE  

Community



                 Index)          12:      MALVIN [address] [place] [address] (03133)

 

      



            BMI (Body Mass  26.3 kg/m2  (no code)  15 - 25 kg/m2  2018  MYRA JAMESONY

                                         Community



                 Index)          12:      57871           [facility] (09780)

 

      



            BMI (Body Mass  26.15 kg/m2  (no code)  15 - 25 kg/m2  2018  DEEDEE JAMESONY                                    Community



                 Index)          17:      36421           [facility] (11501)

 

      



            BMI (Body Mass  25.82 kg/m2  (no code)  15 - 25 kg/m2  2018  MATT

ROXANNA [place]

                                         Community



                 Index)          13:      85907           [facility] (83991)

 

      



            BMI (Body Mass  25.69 kg/m2  (no code)  15 - 25 kg/m2  2018  MATT

RXOANNA [place]

                                         Community



                 Index)          10:      [address] [place] [address] (10376)

 

      



           BMI (Body Mass  26 kg/m2  (no code)  15 - 25 kg/m2  2018  MICHAEL FAM Lena  

Community



                 Index)          12:      [address] [place] [address] (71393)

 

      



            BMI (Body Mass  25.72 kg/m2  (no code)  15 - 25 kg/m2  2018  CRICKET SCHULER                                  Community



                 Index)          08:      [address] ([address])

 

      



           Body height  172.72 cm  (no code)  cm        2014  ROXANN  Co

mmunity



                     13:100400          [address] (81033)

 

      



           Body height  172.72 cm  (no code)  cm        2014  Doctor    Co

mmunity



                     11:          [facility] (07716)

 

      



           Body height  173.99 cm  (no code)  cm        2013  TERRA     Co

mmunity



                     13:          [address] ([address]139)

 

      



           Body height  173.99 cm  (no code)  cm        2012  PALMIRA [facility]



                     [id number] (Other        Health Center



                           Phone:                    of [facility]



                           [phone])            [place] (97342)

 

      



           Body      97.1 [degF]  (no code)  97.8 - 99.0  2019  CRUZITO [facility]

G  Community



              Temperature    [degF]       12:   80605        Health Cent[facility]

r



                                         of [facility] (01654)

 

      



           Body      97.6 [degF]  (no code)  97.8 - 99.0  2018  CRUZITO KIN

G  Community



              Temperature    [degF]       11:   01807        Health Cent[facility]

r



                                         of [facility] (16582)

 

      



           Body      98.7 [degF]  (no code)  97.8 - 99.0  2018  ROXANN  

Community



              Temperature    [degF]       12:   [place] [address]  Health 

nter



                                         [facility] (33936)

 

      



           Body      98.8 [degF]  (no code)  97.8 - 99.0  2018  [facility]



              Temperature    [degF]       12:   [place] [address]  Health 

nter



                                         [facility] (75445)

 

      



           Body      98.7 [degF]  (no code)  97.8 - 99.0  2018  [facility]



              Temperature    [degF]       12:   65822        Health Cente

r



                                         of [facility] (04774)

 

      



           Body      97.2 [degF]  (no code)  97.8 - 99.0  2018  [facility]



              Temperature    [degF]       17:   91985        Health Cente

r



                                         [facility] (90979)

 

      



           Body      98.5 [degF]  (no code)  97.8 - 99.0  2018  [facility]



              Temperature    [degF]       13:   70309        Health Cente

r



                                         [facility] (03133)

 

      



           Body      98.3 [degF]  (no code)  97.8 - 99.0  2018  [facility]



              Temperature    [degF]       10:   26300        Health Cente

r



                                         of [facility] (73417)

 

      



           Body      98.5 [degF]  (no code)  97.8 - 99.0  2018  [facility]



              Temperature    [degF]       12:   04150        Health Cente

r



                                         [facility] (75296)

 

      



           Body      98.7 [degF]  (no code)  97.8 - 99.0  2018  TERRA MISTRY

[facility]



              Temperature    [degF]       08:   [facility] [address]385  [facility]

ter



                                         of [facility] (97190)

 

      



           Body      98.2 [degF]  (no code)  97.8 - 99.0  2014  ROXANN  

[facility]



              temperature    [degF]       13:100400   HOWARD [address]81  Health 

nter



                                         [facility] (27651)

 

      



           Body      97.8 [degF]  (no code)  97.8 - 99.0  2014  Doctor    

[facility]



              temperature    [degF]       11:   [facility]

r



                                         [facility] (93266)

 

      



           Body      98.6 [degF]  (no code)  97.8 - 99.0  2013  [facility]



              temperature    [degF]       13:   [facility] [id number] [place] [address] (49384)

 

      



           Body      98.5 [degF]  (no code)  97.8 - 99.0  2012  [facility]



              temperature    [degF]       12:   97334 (Other  Health Cent

er



                           Phone:                    of [facility]



                           [phone])            [place] (01432)

 

      



           Body weight  82.1 kg   (no code)  kg        2019  [facility]



                     12:000400          79346               [facility] (69998)

 

      



           Body weight  69.72 kg  (no code)  kg        2014  ROXANN  Com

munity



                     13:          LEE [address] [place] [address] (07748)

 

      



           Body weight  69.49 kg  (no code)  kg        2014  Doctor    Com

munity



                     11:310500          Migration           [facility] (31304)

 

      



           Body weight  69.45 kg  (no code)  kg        2013  TERRA     Com

munity



                     13:400500          [facility] [id number] [place] [address] (45870)

 

      



           Body weight  72.38 kg  (no code)  kg        2012  [facility]



                     12:0400          17144 (Other        Health Center



                           Phone:                    of [facility]



                           [phone])            [place] (48498)

 

      



           Height    172.72 cm  (no code)  cm        2019  CRUZITO GRANT  Co

mmunity



                     12:          [address] [place] [address] (85057)

 

      



           Height    172.72 cm  (no code)  cm        2018  CRUZITO GRANT  Co

mmunity



                     11:200500          [address] [place] [address] (15610)

 

      



           Height    172.72 cm  (no code)  cm        2018  ROXANN Bestu

nity



                     12:          LEE [address] [place] [address] (83969)

 

      



           Height    172.72 cm  (no code)  cm        2018  CHERRY Best

unity



                     12:050400          MALVIN [address] [place] [address] (70515)

 

      



           Height    172.72 cm  (no code)  cm        2018  [facility]



                     12:          [address] [place] [address] (92559)

 

      



           Height    172.72 cm  (no code)  cm        2018  [facility]



                     17:          [address] [place] [address] (64674)

 

      



           Height    172.72 cm  (no code)  cm        2018  [facility]  

ommunity



                     13:          [address] [place] [address] (89656)

 

      



           Height    172.72 cm  (no code)  cm        2018  [facility]  

om[facility]ity



                     10:          [address] [place] [address] (45109)

 

      



           Height    172.72 cm  (no code)  cm        2018  [facility]



                     12:          [address] [place] [address] ([id number])

 

      



           Height    172.72 cm  (no code)  cm        2018  [facility]



                     08:          [address] ([id number])

 

      



           Weight    80.7 kg   (no code)  kg        2018  CRUZITO GRANT  Com

munity



                     11:200500          [address] [place] [address] ([id number])

 

      



           Weight    77.93 kg  (no code)  kg        2018  ROXANN  Commun

ity



                     12:          LEE [address]      [facility] ([id number])

 

      



           Weight    77.38 kg  (no code)  kg        2018  [facility]



                     12:          COOMBS [address]      [facility] ([id number])

 

      



           Weight    78.47 kg  (no code)  kg        2018  PRADEEP CHAMPAGNE  C

ommunity



                     12:          [id number] [place] [address] ([id number])

 

      



           Weight    78.02 kg  (no code)  kg        2018  PRADEEP JAMESONY  C

ommunity



                     17:          [id number] [place] [address] ([id number])

 

      



           Weight    77.02 kg  (no code)  kg        2018  JAIMA MITCHELL  Co

mmunity



                     13:          [address] [place] [address] ([id number])

 

      



           Weight    76.66 kg  (no code)  kg        2018  JAIMA MITCHELL  Co

mmunity



                     10:          [address] [place] [address] ([id number])

 

      



           Weight    77.57 kg  (no code)  kg        2018  PRADEEP JAMESONY  C

ommunity



                     12:          [id number] [place] [address] ([id number])

 

      



           Weight    76.75 kg  (no code)  kg        2018  [facility]UL[facility]  C

ommunity



                     08:          SONAM [address] [place] [address] ([id number])



                                                                                
                                                                                
                                                                                
                                                                                
                                                                                
                                                                                
                                                                                
                            



Interventions

          No Information                                                        
  



Plan of Treatment

                      



    



              Normalized Care  Care Detail  Care Activity Date  Care Provider  F

acility



                                         Activity    

 

    



              (ACUTE) Acute Visit  [facility]  2018   CRUZITO GRANT 66

762  [facility] ([id number])

 

    



              (IPT) Internal PCP  [facility]  10-   ZHANE DAY 6676

2  [facility] ([id number])



                                                                                
        



Goals

          No Information                                                        
  



Social History

          No Information                                                        
  



Functional Status

                      The data below is from unstructured sources            



                    Query                   Response                   Date [place]

rded                

 

                          Comprehension Ability                   Understands Co

ncepts                    

                                        2017 6:56pm                



                                                                    



Mental Status

          No Information                                                        
  



Encounters

                      



    



              Encounter    Normalized Encounter  Encounter Diagnosis  Care Provi

li  

Organization



                           Date                      Type   

 

    



              2020   [facility]  Major depressive  CARMEN JAQUELINE (no  [facility]

[facility] (no 

phone)



                           disorder, recurrent,      phone) 



                                         moderate  

 

    



              2020   [facility]  Viral infection,  CRUZITO GRANT (n

o phone)  

[facility]



                     CARE                unspecified         CARE (no phone)

 

    



              2020   [facility]  Influenza due to other  CHELO B

ANDREA (no  

[facility]



                 CARE            identified influenza  phone)          CARE (no 

phone)



                                         virus with other  



                                         respiratory  



                                         manifestations  

 

    



              2020   [facility]  Other general symptoms  DANA OR

ELLANA (no  

[facility]



                 CARE            and signs       phone)          CARE (no phone)

 

    



              2020   [facility]  Encounter for  CRUZITO GRANT (no

 phone)  

[facility]



                           contraceptive             (no phone)



                                         management,  



                                         unspecified  

 

    



              [facility]      Emergency department  no information  no name      no

 organization name



                           2017                patient visit   



                                         -    



                                         2017    

 

    



              NEGATED      Patient encounter  no information  no name      no or

ganization name



                                         2018   Patient encounter  no information  no name      no or

ganization name

 

    



              2018   Patient encounter  no information  no name      no or

ganization name

 

    



              2018   Patient encounter  no information  no name      no or

ganization name

 

    



              2018   Patient encounter  no information  no name      no or

ganization name

 

    



              NEGATED      Patient encounter  no information  no name      no or

ganization name



                                         2017    



                                         -    



                                         2017   Patient encounter  no information  no name      no or

ganization name

 

    



              2017   Patient encounter  no information  no name      no or

ganization name

 

    



              NEGATED      Patient encounter  no information  no name      no or

ganization name



                                         2017    

 

    



              NEGATED      Patient encounter  no information  no name      no or

ganization name



                                         2017   Patient encounter  no information  no name      no or

ganization name

 

    



              2017   Patient encounter  no information  no name      no or

ganization name



                                         -    



                                         2017   Patient encounter  no information  no name      no or

ganization name



                                         -    



                                         2017   Patient encounter  no information  no name      no or

ganization name

 

    



              2015   Patient encounter  no information  no name      no or

ganization name

 

    



                 Patient encounter  no information  no name         no organizat

ion name

 

    



              2020   Patient encounter  no information  (no phone)   [facility] (no phone)

 

    



              2020   Patient encounter  no information  [facility]UZITO GRANT (no 

 [facility]



                     procedure           phone)              [facility] (no phone)

 

    



              2020   Patient encounter  no information  no name      no or

ganization name



                                         procedure   

 

    



              2020   Patient encounter  no information  no name      no or

ganization name



                                         procedure   

 

    



              2019   Patient encounter  no information  no name      no or

ganization name



                                         procedure   

 

    



              10-   Patient encounter  no information  no name      no or

ganization name



                                         procedure   

 

    



              2019   Patient encounter  no information  no name      no or

ganization name



                                         procedure   

 

    



              2019   Patient encounter  no information  no name      no or

ganization name



                                         procedure   

 

    



              2017   Patient encounter  no information  no name      no or

ganization name



                                         procedure   

 

    



              2017   Patient encounter  no information  no name      no or

ganization name



                                         procedure   

 

    



              2017   Patient encounter  no information  no name      no or

ganization name



                                         procedure   

 

    



              2017   Patient encounter  no information  no name      no or

ganization name



                                         procedure   

 

    



              2017   Patient encounter  no information  no name      no or

ganization name



                                         procedure   

 

    



              2016   Patient encounter  no information  no name      no or

ganization name



                                         procedure   

 

    



              2020   Telephone encounter  no information  CRUZITO JUANITA (no 

phone)  [facility]



                                         (no phone)

 

    



              2020   Telephone encounter  no information  CRUZITO JUANITA (no 

phone)  [facility]



                                         (no phone)

 

    



                 no information  Encounter for other  no name         no organiz

ation name



                                         preprocedural  



                                         examination  

 

    



              NEGATED      no information  Encounter for other  no name      no 

organization name



                                         preprocedural  



                                         examination  

 

    



                 no information  Encounter for   no name         no organization

 name



                                         preprocedural  



                                         laboratory examination  



                                                                                
                                                                                
                                                                                
                                                                                
                                                                                
                                                                              



Medical Equipment

          No Information                                                        
  



Payers

          No Information                                                        
  



Summary Purpose

          eClinicalWorks SubmissioneClinicalWorks SubmissioneClinicalWorks 
SubmissioneClinicalWorks SubmissioneClinicalWorks SubmissioneClinicalWorks 
SubmissioneClinicalWorks SubmissioneClinicalWorks SubmissioneClinicalWorks 
SubmissioneClinicalWorks SubmissioneClinicalWorks Submission                    
                                      



Advance Directives

                      



                    Directive                   Response                   Recor

ded Date/Time       

         

 

                    Advance Directives                   No                    8:37am       

         

 

                    Resuscitation Status                   Full Code            

       17 

8:37am                



            



                    Directive                   Response                   Recor

ded Date/Time       

         

 

                    Advance Directives                   No                    8:49am       

         

 

                    Health Care Power of                    No          

         17 

8:49am                

 

                    Organ Donor                   No                   17 

8:49am              

  

 

                    Resuscitation Status                   Full Code            

       17 

8:49am                



            



                    Directive                   Response                   Recor

ded Date/Time       

         

 

                    Advance Directives                   No                    11:10am      

          

 

                    Health Care Power of                    No          

         17 

11:10am                

 

                    Organ Donor                   No                   17 

11:10am             

   

 

                    Resuscitation Status                   Full Code            

       17 

11:10am                



            



                    Directive                   Response                   Recor

ded Date/Time       

         

 

                    Advance Directives                   No                    6:56pm       

         

 

                    Health Care Power of                    No          

         17 

6:56pm                

 

                    Organ Donor                   No                   17 

6:56pm              

  

 

                    Resuscitation Status                   Full Code            

       17 

6:56pm                



                                                                    



Discharge Instructions

          No hospital discharge instructions.                          

Patient Instructions              

Physician Instructions              

              

Follow Up Appt in 2 weeks              

              

Activity as tolerated              

No driving for 24 hours              

No driving while on pain medications              

              

Incentive Spirometry use every 2 hours while awake              

              

High Fiber Diet 25g or more per day              

              

Avoid Alcohol, Caffeine, Spicy Greasy and Acid foods.              

              

Drink 64 fluid oz or more of fluids per day.              

              

Symptoms to Report: Fever over 101 degree F, Nausea/Vomiting               

If any problems/questions: Contact your physician or go to Emergency Room       
       

              

                                      

Patient Instructions              

Physician Instructions              

              

New, Converted, or Re-Newed RX: RX on Chart              

Follow Up Appt in 2 weeks              

              

Activity as tolerated              

              

High Fiber Diet 25g or more per day              

              

Avoid Alcohol, Caffeine, Spicy Greasy and Acid foods.              

              

Drink 64 fluid oz or more of fluids per day.              

              

Symptoms to Report: Fever over 101 degree F, Nausea/Vomiting               

If any problems/questions: Contact your physician or go to Emergency Room       
       

              

            No hospital discharge instruction information available.            
                                              



Additional Source Comments

          This clinical document has been generated using ex[employer] 
software that has been certified by the Office of the National Coordinator for 
Health Information Technology (ONC 15.99.04.3023.Diam.31.00.0.554815) and the 
National Committee for  (NCQA, as an eMeasure certified 
technology).            

            

FOR RECORDS PERTAINING TO PATIENTS WHO ARE OR HAVE BEEN ENROLLED IN A CHEMICAL D
EPENDENCY/SUBSTANCE ABUSE PROGRAM, SOME INFORMATION MAY BE OMITTED. This clinica
l summary was aggregated from multiple sources.  Caution should be exercised in 
using it in the provision of clinical care. This summary normalizes information 
from multiple sources, and as a consequence, information in this document may ma
terially change the coding, format and clinical context of patient data. In tiago
tion, data may be omitted in some cases. CLINICAL DECISIONS SHOULD BE BASED ON T
HE PRIMARY CLINICAL RECORDS. [employer] provides no warranty or guara
ntee of the accuracy or completeness of information in this document.The followi
ng information is based on time limited clinical information            



                                        UNRECOGNIZED CONTENT PROVIDED BELOW FOR 

UNRECOGNIZED SECTION MEDICAL (GENERAL) 

HISTORY                

 

                                                         



            



                    Type                   Description                   Date   

             

 

                    Medical History                   Anxiety                   

                 

 

                    Medical History                   hypertension              

                    

  

 

                    Medical History                   gastroenteritis           

                    

     

 

                    Surgical History                   wisdom teeth extraction  

                    

              

 

                    Surgical History                   cholecsytectomy          

         2017   

             

 

                    Surgical History                   Colonoscopy, EGD         

          2017   

             

 

                    Hospitalization History                   child birth       

                    

         



            



                    Type                   Description                   Date   

             

 

                    Medical History                   Anxiety                   

                 

 

                    Medical History                   hypertension              

                    

  

 

                    Medical History                   gastroenteritis           

                    

     

 

                    Medical History                   IBS                       

             

 

                    Surgical History                   wisdom teeth extraction  

                    

              

 

                    Surgical History                   cholecsytectomy          

         2017   

             

 

                    Surgical History                   Colonoscopy, EGD         

          2017   

             

 

                    Hospitalization History                   child birth       

                    

         

 

                          Hospitalization History                   Possible radha

endicitis                 

                                        2017                



            



                                        UNRECOGNIZED CONTENT PROVIDED BELOW FOR 

UNRECOGNIZED SECTION REASON FOR VISIT   

             

 

                                                         



Controlled Med Refillsore throat/fever that started yesterday. carlos gonzales...was here 3 days ago for this same complaint...started on prednisone
. pt reports she is taking this medication. this is day 3 of the steroid. derian guzmán, instructed pt to fu with pradeep champagne next week. increase fluids, continu
e with current rx's, et get plenty of rest. pt verbalized 
understandingcongestion/ear ache, sore throat and headache started Sat 
JStrasserRNwas dx with UTI with hematuria on monday. has finished appropriate 
antibiotics according to urine cx. still feels nauseated, headache, denies 
dysuria. feels like there is a knot in her LUQ. aniket LMP ended 
productive cough, runny nose, ear discomfort (bilaterally), chest 
congestion x 1.5 wks. Pt was seen last Tuesday with same symptoms. Pt was rx 
nasal spray and steroids. She states that she does not feel any better after 
previous treatment and that her symptoms are worse.-awoodsMedication questionref
ill requestmed f/u ALIYA SUMNER-Migmed f/u -- rajeev hinkle, patient states she
 is traveling in may to [place] and would like to have a physiscal Medication Ref
illRefill request
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
## 2020-06-19 NOTE — XMS REPORT
[facility]

                             Created on: 10/04/2018



Courtney Grayson

External Reference #: [id number]

: 1980

Sex: Female



Demographics





                          Address                   [address] [place] [address]  [id number]

 

                          Preferred Language        Unknown

 

                          Marital Status            Unknown

 

                          Shinto Affiliation     Unknown

 

                          Race                      Unknown

 

                          Ethnic Group              Unknown





Author





                          Author                    Courtney COOMBS

 

                          Organization              [facility]

 

                          Address                   [address] [place]  [address]



 

                          Phone                     [phone]







Care Team Providers





                    Care Team Member Name Role                Phone

 

                    CHERRY COOMBS Unavailable         [phone]







PROBLEMS





ALLERGIES





ENCOUNTERS





IMMUNIZATIONS

No Known Immunizations



SOCIAL HISTORY

No smoking Hx information available



REASON FOR VISIT





PLAN OF CARE





VITAL SIGNS





MEDICATIONS





RESULTS

No Results



PROCEDURES





INSTRUCTIONS





MEDICATIONS ADMINISTERED

No Known Medications



MEDICAL (GENERAL) HISTORY
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
## 2020-06-19 NOTE — XMS REPORT
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

 

                    Surgical History    Colonoscopy, EGD    2017

 

                    Hospitalization History child birth          

 

                    Hospitalization History Possible appendicitis 2017
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
## 2020-06-19 NOTE — XMS REPORT
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 Results



PROCEDURES

No Known procedures



INSTRUCTIONS





MEDICATIONS ADMINISTERED

No Known Medications



MEDICAL (GENERAL) HISTORY





                    Type                Description         Date

 

                    Medical History     Anxiety              

 

                    Medical History     hypertension         

 

                    Medical History     gastroenteritis      

 

                    Medical History     IBS                  

 

                    Surgical History    wisdom teeth extraction  

 

                    Surgical History    cholecsytectomy     2017

 

                    Surgical History    Colonoscopy, EGD    2017

 

                    Hospitalization History child birth          

 

                    Hospitalization History Possible appendicitis 2017

## 2020-06-19 NOTE — XMS REPORT
[facility]

                             Created on: 2020



Courtney Grayson

External Reference #: [id number]

: 1980

Sex: Female



Demographics





                          Address                   [address] [place]  [address]

 

                          Preferred Language        Unknown

 

                          Marital Status            Unknown

 

                          [employer] Affiliation     Unknown

 

                          Race                      Unknown

 

                          Ethnic Group              Unknown





Author





                          Author                    Courtney HOWARD

 

                          Organization              [facility] [place]

 

                          Address                   [address] [place]  [address]



 

                          Phone                     [phone]







Care Team Providers





                    Care Team Member Name Role                Phone

 

                    ROXANN HOWARD Unavailable         [phone]







PROBLEMS





          Type      Condition ICD9-CM Code QBW39-JA Code Onset Dates Condition S

tatus SNOMED 

Code

 

          Problem   Hypercholesteremia           E78.00              Active    1

1065503

 

          Problem   Anxiety             F41.9               Active    34327799

 

          Problem   Elevated LDL cholesterol level           E78.00             

 Active    153603576

 

          Problem   Irregular heartbeat           I49.9               Active    

342709028

 

           Problem    Irritable bowel syndrome with both constipation and diarrh

ea            K58.2                 

Active                                  [id number]

 

          Problem   Primary insomnia           F51.01              Active    397

2004

 

          Problem   Rhinosinusitis           J32.9               Active    05431

4004

 

          Problem   Postconcussive syndrome           F07.81              Active

    43219950

 

           Problem    Moderate episode of recurrent major depressive disorder   

         F33.1                 Active

                                        093457572

 

          Problem   Essential hypertension           I10                 Active 

   48867892

 

          Problem   Overweight (BMI 25.0-29.9)           E66.3               Act

britany    340261568

 

          Problem   Seasonal allergic rhinitis due to pollen           J30.1    

           Active    69416722

 

                Problem         Migraine without aura and without status migrain

osus, not intractable                 

G43.009                                 Active              [id number]

 

                Problem         Migraine without aura and without status migrain

osus, not intractable                 

G43.009                                 Active              108660333







ALLERGIES

No Information



ENCOUNTERS





                Encounter       Location        Date            Diagnosis

 

                [facility]     [address] [facility] [address]

      

 

                [facility]     [address] [address]

      

 

                [facility]     [id number] [place] [address]

 30 Dec, 2019    

Moderate episode of recurrent major depressive disorder F33.1 and Anxiety F41.9

 

                [facility]     [id number] [place] [address]

 18 Dec, 2019    

Anxiety F41.9 and Moderate episode of recurrent major depressive disorder F33.1

 

                    [place] [address] [address]                                Anxiety F41.9 and Rhinosinusitis J32.9

 

                          [facility] [place] [address] 

[address]                              Sore throat J02.9

 

                    [place] [address] [address] 11 

Oct, 2019                               Encounter for immunization Z23

 

                    [place] [address] [address] 13 

Sep, 2019                               Migraine without aura and without status

 migrainosus, not intractable 

G43.009 ; Anxiety F41.9 and Essential hypertension I10

 

                          [facility] IN [place] [address] 

[address]                11 Sep, 2019              Migraine without aura and wi

thout status migrainosus, 

not intractable G43.009

 

                          [facility] IN [place] [address] 

[address]                27 Aug, 2019              Acute nonintractable headach

e, unspecified headache type

R51

 

                          [facility] [place] [address] 

[address]                26 Aug, 2019              Acute intractable headache, 

unspecified headache type 

R51

 

                          [facility] [place] [address] 

[address]                              Dysuria R30.0

 

                    [place] [address] [address]                                Essential hypertension I10

 

                    [place] [address] [address] 28 

Mar, 2019                               Anxiety F41.9

 

                    [place] [address] [address] 19 

Mar, 2019                               Anxiety F41.9 and Encounter for initial 

prescription of contraceptive 

pills Z30.011

 

                    [place] [address] [address] 08 

2018                               Anxiety F41.9

 

                    [place] [address] [address]                                Anxiety F41.9

 

                    [place] [address] [address] 29 

Oct, 2018                               Allergic rhinitis, unspecified J30.9

 

                    [place] [address] [address] 24 

Oct, 2018                               Primary insomnia F51.01

 

                          [facility] IN [address] 

[address]                21 Oct, 2018              Lower abdominal pain R10.30 

; Sensation of pressure in 

bladder area R39.89 and Acute right-sided low back pain without sciatica M54.5

 

                    [address] [address] 18 

Oct, 2018                               Screening for diabetes mellitus Z13.1 ; 

Screening for thyroid disorder

Z13.29 ; Screening for hyperlipidemia Z13.220 ; Primary insomnia F51.01 ; 
Anxiety F41.9 and Irritable bowel syndrome with both constipation and diarrhea 
K58.2

 

                    [address] [address] 08 

Oct, 2018                               Encounter for immunization Z23

 

                          [facility] IN [address] 

[address]                21 Sep, 2018              Left upper quadrant pain R10

.12 and Family history of 

nephrolithiasis Z84.1

 

                          [facility] IN [address] 

[address]                17 Sep, 2018              Left upper quadrant pain R10

.12 ; Acute cystitis without

hematuria N30.00 and Constipation, unspecified constipation type K59.00

 

                    [address] [address] 11 

Sep, 2018                               Seasonal allergic rhinitis due to pollen

 J30.1

 

                          [facility] IN [address] [place] 

[address]                07 Sep, 2018               

 

                          [facility]  [address] 

[address]                04 Sep, 2018              Allergic rhinitis, unspecifi

ed J30.9 and Cough R05

 

                          [facility]  [id number] [place] [address] 

[address]                03 Aug, 2018              Sore throat J02.9 ; Allergic

 rhinitis, unspecified J30.9

; Post-nasal drainage R09.82 ; Other viral agents as the cause of diseases 
classified elsewhere B97.89 and Acute pharyngitis due to other specified 
organisms J02.8

 

                    [place] [address] [address] 10 

Jul, 2018                               Primary insomnia F51.01

 

                    [place] [address] [address]                                 

 

                    [place] [address] [address]                                 

 

                    [place] [address] [address] 24 

May, 2018                               Anxiety F41.9 ; Hypercholesteremia E78.0

0 ; Irritable bowel syndrome 

with both constipation and diarrhea K58.2 ; Primary insomnia F51.01 ; Overweight
(BMI 25.0-29.9) E66.3 and Essential hypertension I10

 

                    [place] [address] [address] 22 

May, 2018                                

 

                    [place] [address] [address] 08 

Mar, 2018                                

 

                          [facility]  [address] 

[address]                08 Mar, 2018              Acute atopic conjunctivitis,

 bilateral H10.13 and 

Allergic rhinitis, unspecified J30.9

 

                    [place] [address] [address]                                Anxiety F41.9 ; Hospital discharge follo

w-up Z09 ; Irritable bowel 

syndrome with both constipation and diarrhea K58.2 and Other insomnia G47.09

 

                          [facility]  [address] [place], [place] [id number]

[address] [place] [address] 

[address]                              Body aches R52 and Influenza

 B J10.1

 

                    [facility]       [id number] [place], [place] [id number] 21 Dec, 

2017                                     

 

                          [facility]  [address] [place], [place] [address]

[address] [place] [address] 

[address]                20 Dec, 2017              Right lower quadrant abdomin

al tenderness with rebound 

tenderness R10.823

 

                    [address] [address]                                Hospital discharge follow-up Z09 ; Postc

oncussive syndrome F07.81 ; 

Essential hypertension I10 ; Hypercholesteremia E78.00 ; Cervical spine pain 
M54.2 and Irregular heartbeat I49.9

 

                    [place] [id number] [place] [address] 25 

Oct, 2017                               Postconcussive syndrome F07.81 and Whipl

tristan injury to neck, initial 

encounter S13.4XXA

 

                    [address] [address] 24 

Oct, 2017                               Encounter to establish care Z76.89 ; Pos

tconcussive syndrome F07.81 

and Essential hypertension I10

 

                    [address] [address] 20 

Oct, 2017                               Encounter to establish care Z76.89 ; Pos

tconcussive syndrome F07.81 

and Essential hypertension I10

 

                          [facility]  [address] [place], [place] [address]

[address] [place] [address] 

[address]                04 Oct, 2017              Postconcussion syndrome F07.

81 and Whiplash injury to 

neck, initial encounter S13.4XXA

 

                          [facility]  [address] [place], [place] [id number]

[address] [place] [address] 

[address]                28 Sep, 2017              Whiplash injury to neck, sub

sequent encounter S13.4XXD

 

                          [facility]  [address] 

[address]                13 Sep, 2017              Whiplash injury to neck, ini

tial encounter S13.4XXA ; 

Cervicalgia M54.2 and Nausea R11.0

 

                    [place] [address] [address] 12 

Sep, 2017                               Encounter for immunization Z23

 

                          [facility]  [address] [place] [address] 

[address]                02 Sep, 2017              Sore throat J02.9 and Exposu

re to strep throat Z20.818

 

                    [place] [address] [address] 14 

Aug, 2017                               Anxiety F41.9 ; HTN (hypertension) I10 a

nd Irritable bowel syndrome 

with both constipation and diarrhea K58.2

 

                    [place] [address] [address] 10 

Aug, 2017                               HTN (hypertension) I10 ; Anxiety F41.9 ;

 Irritable bowel syndrome with

both constipation and diarrhea K58.2 and Environmental allergies Z91.09

 

                    [place] [address] [address]                                Anxiety F41.9

 

                          [facility]  [facility] [address] 

[address]                17 2017              Sore throat J02.9

 

                          [facility]  [address] [place] [address] 

[address]                10 2017              Sore throat J02.9 and Strep 

throat J02.0

 

                    [place] [address] [address] 02 

Mar, 2017                               Dysuria R30.0 ; HTN (hypertension) I10 ;

 Generalized abdominal pain 

R10.84 and Anxiety F41.9

 

                    [place] [address] [address]                                Anxiety F41.9

 

                    [place] [address] [address] 14 

Dec, 2016                                

 

                          [facility]  [address] [place] [id number]

[address] [place] [address] 

[address]                08 Dec, 2016              Dysuria R30.0 and Lower abdo

vilma pain R10.30

 

                    [facility] [place] [address] [address] 05 

Dec, 2016                                

 

                    [facility] [place] [address] [address] 01 

Dec, 2016                               Dysuria R30.0 ; HTN (hypertension) I10 a

nd Anxiety F41.9

 

                    [facility] [place] [address] [address]                                 

 

                          [facility]  [address] 

[address]                               

 

                          [facility] IN [place] [address] 

[address]                              Pelvic pain R10.2 and Candid

al skin infection B37.2

 

                    [facility] [place] [address] [address]                                 

 

                    [place] [address] [address]                                 

 

                          [facility] IN [place] [address] [address] [place] [id number]

[address] [place] [address] 

[address]                              Urinary tract infection N39.

0

 

                    [place] [address] [address]                                 

 

                    [facility] [place] [address] [address]                                 

 

                    [place] [address] [address]                                 

 

                    [facility] [place] [address] [address]                                 

 

                    [place] [address] [address]                                Dysuria R30.0 and Acute cystitis without

 hematuria N30.00

 

                    [place] [address] [address] 13 

Oct, 2016                               Anxiety F41.9 and HTN (hypertension) I10

 

                          [facility]  [address] [facility] [id number]

[address] [place] [address] 

[address]                09 Sep, 2016              Acute non-recurrent maxillar

y sinusitis J01.00 and 

Allergic rhinitis, unspecified allergic rhinitis trigger, unspecified rhinitis 
seasonality J30.9

 

                    [place] [address] [address] 29 

Aug, 2016                               HTN (hypertension) I10 and Anxiety F41.9

 

                    [place] [address] [address]                                 

 

                    [place] [address] [address]                                HTN (hypertension) I10

 

                [address]  [id number] [address]

 26 May, 2016     

 

                    [place] [address] [address] 24 

May, 2016                               Anxiety F41.9 and HTN (hypertension) I10

 

                    [place] [address] [address] 02 

May, 2016                               Anxiety F41.9 ; HTN (hypertension) I10 a

nd Environmental allergies 

Z91.09

 

                    [place] [address] [address]                                 

 

                          [facility] IN [facility]  [address] [facility] [id number]

[address] [place] [address] 

[address]                16 2016              Elevated blood pressure (not

 hypertension) R03.0 and 

Acute anxiety F41.9

 

                    [place] [address] [address] 29 

Oct, 2015                               Allergic rhinitis J30.9 and Laryngitis J

04.0

 

                    [place] [address] [address] 13 

Oct, 2015                               Encounter for immunization Z23

 

                    [facility] [id number] [place] [address] 29 

Sep, 2015                               Sore throat 462

 

                    [facility] [id number] [place] [address] 08 

Aug, 2015                               Pain of right thumb 729.5

 

                    [facility] [id number] [place] [address]                                 

 

                    [facility] [id number] [place] [address]                                 

 

                    [facility] [id number] [place] [address] 08 

Oct, 2014                                

 

                    [facility] [id number] [place] [address] 08 

Oct, 2014                                

 

                    [facility] [id number] [place] [address] 22 

Aug, 2014                                

 

                    [facility] [id number] [place] [address] 22 

Aug, 2014                                

 

                    [facility] [id number] [place] [address] 21 

Aug, 2014                                

 

                    [facility] [id number] [place] [address] 21 

Aug, 2014                                

 

                    [facility] [id number] [place] [address]                                 

 

                    [facility] [id number] [place] [address]                                 

 

                    [facility] [id number] [place] [address] 15 

Apr, 2014                                

 

                    [facility] [id number] [place] [address] 15 

Apr, 2014                                

 

                    [facility] [id number] [place] [address] 20 

Mar, 2014                                

 

                    [facility] [id number] [place] [address] 20 

Mar, 2014                                

 

                    [facility] [id number] [place] [address]                                 

 

                    [facility] [place] [id number] [place] [address]                                 

 

                    [facility] [place] [id number] [place] [address] 30 

Dec, 2013                                

 

                    [facility] [place] [id number] [place] [address] 30 

Dec, 2013                                

 

                    [facility] [place] [id number] [place] [address] 25 

Sep, 2013                                

 

                    [facility] [place] [id number] [place] [address] 22 

Aug, 2013                                

 

                    [facility] [place] [id number] [place] [address] 16 

Aug, 2013                                

 

                    [facility] [place] [id number] [place] [address]                                 

 

                    [facility] [place] [id number] [place] [address] 16 

Oct, 2012                                

 

                    [facility] [place] [id number] [place] [address] 16 

Oct, 2012                                

 

                    [facility] [place] [id number] [place] [address]                                 

 

                    [facility] [place] [id number] [place] [address]                                 

 

                    [facility] [place] [id number] [place] [address]                                 

 

                    [facility] [place] [id number] [place] [address] 21 

Dec, 2009                                

 

                    [facility] [place] [id number] [place] [address]                                 







IMMUNIZATIONS

No Known Immunizations



SOCIAL HISTORY

Never Assessed



REASON FOR VISIT





PLAN OF CARE





VITAL SIGNS





                    Height              68 in               2014

 

                    Weight              153 lbs             2014

 

                    Temperature         98.5 degrees Fahrenheit 2014

 

                    Heart Rate          102 bpm             2014

 

                    Respiratory Rate    20                  2014

 

                    Blood pressure systolic 126 mmHg            2014

 

                    Blood pressure diastolic 86 mmHg             2014







MEDICATIONS

Unknown Medications



RESULTS

No Results



PROCEDURES





                Procedure       Date Ordered    Result          Body Site

 

                URINE PREGNANCY TEST 2014                    







INSTRUCTIONS





MEDICATIONS ADMINISTERED

No Known Medications



MEDICAL (GENERAL) HISTORY





                    Type                Description         Date

 

                    Medical History     Anxiety              

 

                    Medical History     hypertension         

 

                    Medical History     gastroenteritis      

 

                    Medical History     IBS                  

 

                    Surgical History    wisdom teeth extraction  

 

                    Surgical History    cholecsytectomy     2017

 

                    Surgical History    Colonoscopy, EGD    2017

 

                    Hospitalization History child birth          

 

                    Hospitalization History Possible appendicitis 2017
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
## 2020-06-19 NOTE — XMS REPORT
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 teeth extraction  

 

                    Surgical History    cholecsytectomy     2017

 

                    Surgical History    Colonoscopy, EGD    2017

 

                    Hospitalization History child birth          

 

                    Hospitalization History Possible appendicitis 2017
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
## 2020-06-19 NOTE — XMS REPORT
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  

 

                    Surgical History    cholecsytectomy     2017

 

                    Surgical History    Colonoscopy, EGD    2017

 

                    Hospitalization History child birth          

 

                    Hospitalization History Possible appendicitis 2017
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
## 2020-06-29 ENCOUNTER — HOSPITAL ENCOUNTER (OUTPATIENT)
Dept: HOSPITAL 75 - PREOP | Age: 40
Discharge: HOME | End: 2020-06-29
Attending: SURGERY
Payer: COMMERCIAL

## 2020-06-29 VITALS — WEIGHT: 201.94 LBS | HEIGHT: 67.99 IN | BODY MASS INDEX: 30.61 KG/M2

## 2020-06-29 DIAGNOSIS — Z20.828: ICD-10-CM

## 2020-06-29 DIAGNOSIS — K62.5: ICD-10-CM

## 2020-06-29 DIAGNOSIS — Z01.812: Primary | ICD-10-CM

## 2020-06-29 PROCEDURE — 87635 SARS-COV-2 COVID-19 AMP PRB: CPT

## 2020-07-01 ENCOUNTER — HOSPITAL ENCOUNTER (OUTPATIENT)
Dept: HOSPITAL 75 - ENDO | Age: 40
Discharge: HOME | End: 2020-07-01
Attending: SURGERY
Payer: COMMERCIAL

## 2020-07-01 VITALS — DIASTOLIC BLOOD PRESSURE: 70 MMHG | SYSTOLIC BLOOD PRESSURE: 108 MMHG

## 2020-07-01 VITALS — SYSTOLIC BLOOD PRESSURE: 109 MMHG | DIASTOLIC BLOOD PRESSURE: 60 MMHG

## 2020-07-01 VITALS — SYSTOLIC BLOOD PRESSURE: 125 MMHG | DIASTOLIC BLOOD PRESSURE: 93 MMHG

## 2020-07-01 VITALS — SYSTOLIC BLOOD PRESSURE: 112 MMHG | DIASTOLIC BLOOD PRESSURE: 76 MMHG

## 2020-07-01 VITALS — DIASTOLIC BLOOD PRESSURE: 66 MMHG | SYSTOLIC BLOOD PRESSURE: 107 MMHG

## 2020-07-01 VITALS — DIASTOLIC BLOOD PRESSURE: 77 MMHG | SYSTOLIC BLOOD PRESSURE: 117 MMHG

## 2020-07-01 VITALS — DIASTOLIC BLOOD PRESSURE: 68 MMHG | SYSTOLIC BLOOD PRESSURE: 113 MMHG

## 2020-07-01 VITALS — DIASTOLIC BLOOD PRESSURE: 76 MMHG | SYSTOLIC BLOOD PRESSURE: 112 MMHG

## 2020-07-01 VITALS — DIASTOLIC BLOOD PRESSURE: 85 MMHG | SYSTOLIC BLOOD PRESSURE: 130 MMHG

## 2020-07-01 VITALS — DIASTOLIC BLOOD PRESSURE: 93 MMHG | SYSTOLIC BLOOD PRESSURE: 141 MMHG

## 2020-07-01 VITALS — WEIGHT: 201.94 LBS | BODY MASS INDEX: 30.61 KG/M2 | HEIGHT: 67.99 IN

## 2020-07-01 VITALS — DIASTOLIC BLOOD PRESSURE: 94 MMHG | SYSTOLIC BLOOD PRESSURE: 142 MMHG

## 2020-07-01 VITALS — DIASTOLIC BLOOD PRESSURE: 74 MMHG | SYSTOLIC BLOOD PRESSURE: 113 MMHG

## 2020-07-01 DIAGNOSIS — Z90.49: ICD-10-CM

## 2020-07-01 DIAGNOSIS — K64.1: ICD-10-CM

## 2020-07-01 DIAGNOSIS — Z88.1: ICD-10-CM

## 2020-07-01 DIAGNOSIS — I10: ICD-10-CM

## 2020-07-01 DIAGNOSIS — Z79.891: ICD-10-CM

## 2020-07-01 DIAGNOSIS — F41.9: ICD-10-CM

## 2020-07-01 DIAGNOSIS — F32.9: ICD-10-CM

## 2020-07-01 DIAGNOSIS — Z86.010: ICD-10-CM

## 2020-07-01 DIAGNOSIS — K92.1: ICD-10-CM

## 2020-07-01 DIAGNOSIS — Z79.899: ICD-10-CM

## 2020-07-01 DIAGNOSIS — K21.9: ICD-10-CM

## 2020-07-01 DIAGNOSIS — Z80.0: ICD-10-CM

## 2020-07-01 DIAGNOSIS — Z82.3: ICD-10-CM

## 2020-07-01 DIAGNOSIS — K64.8: Primary | ICD-10-CM

## 2020-07-01 PROCEDURE — 84703 CHORIONIC GONADOTROPIN ASSAY: CPT

## 2020-07-01 PROCEDURE — 88305 TISSUE EXAM BY PATHOLOGIST: CPT

## 2020-07-01 RX ADMIN — MIDAZOLAM PRN MG: 1 INJECTION INTRAMUSCULAR; INTRAVENOUS at 14:10

## 2020-07-01 RX ADMIN — MIDAZOLAM PRN MG: 1 INJECTION INTRAMUSCULAR; INTRAVENOUS at 13:58

## 2020-07-01 RX ADMIN — MIDAZOLAM PRN MG: 1 INJECTION INTRAMUSCULAR; INTRAVENOUS at 13:56

## 2020-07-01 RX ADMIN — MIDAZOLAM PRN MG: 1 INJECTION INTRAMUSCULAR; INTRAVENOUS at 14:02

## 2020-07-01 NOTE — PROGRESS NOTE-POST OPERATIVE
Post-Operative Progess Note


Surgeon (s)/Assistant (s)


Surgeon


AP ROWE MD


Assistant:  none





Pre-Operative Diagnosis


rectal bleeding, abd pain





Post-Operative Diagnosis





mild chronic stage 2 ext and int hemorrhoids. no mucosal inflammatory


changes.





Procedure & Operative Findings


Date of Procedure


7/1/20


Procedure Performed/Findings


colonoscopy with bx


Anesthesia Type


cs





Estimated Blood Loss


Estimated blood loss (mL):  minimal





Specimens/Packing


Specimens Removed


cecum, asc colon, desc colon, rectum











AP ROWE MD                 Jul 1, 2020 14:28

## 2020-07-01 NOTE — DISCHARGE INST-SURGICAL
D/C Lap Instructions-JAE


Follow Up 





Activity as tolerated





High Fiber Diet 25g or more per day





Avoid Alcohol, Caffeine, Spicy Greasy and Acid foods.





Drink 64 fluid oz or more of fluids per day.





Symptoms to Report: Fever over 101 degree F, Nausea/Vomiting 


If any problems/questions: Contact your physician or go to Emergency Room











AP ROWE MD                 Jul 1, 2020 12:20

## 2020-07-01 NOTE — XMS REPORT
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 [place] [address], [place] [address]

                                         

 

                          [facility]     [address] [id number]

[address] [place] [address], [place] [address]

                                         

 

                          [facility]     [address] [id number]

[address] [place] [address], [place] [address]

                                         

 

                          [facility]     [address] [id number]

[address] [place] [address], [place] [address]

                          21 Dec, 2009               

 

                          [facility]     [address] [facility] [id number]

[address] [place] [address]

                                         







IMMUNIZATIONS

No Known Immunizations



SOCIAL HISTORY

Never Assessed



REASON FOR VISIT





PLAN OF CARE





VITAL SIGNS





                    Height              68 in               2013

 

                    Weight              153 lbs             2013

 

                    Temperature         97.2 degrees Fahrenheit 2013

 

                    Heart Rate          80 bpm              2013

 

                    Respiratory Rate    16                  2013

 

                    Blood pressure systolic 120 mmHg            2013

 

                    Blood pressure diastolic 80 mmHg             2013







MEDICATIONS

No Known Medications



RESULTS

No Results



PROCEDURES

No Known procedures



INSTRUCTIONS





MEDICATIONS ADMINISTERED

No Known Medications



MEDICAL (GENERAL) HISTORY





                    Type                Description         Date

 

                    Medical History     Anxiety              

 

                    Medical History     hypertension         

 

                    Medical History     gastroenteritis      

 

                    Medical History     IBS                  

 

                    Surgical History    wisdom teeth extraction  

 

                    Surgical History    cholecsytectomy     2017

 

                    Surgical History    Colonoscopy, EGD    2017

 

                    Hospitalization History child birth          

 

                    Hospitalization History Possible appendicitis 2017

## 2020-07-01 NOTE — XMS REPORT
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

 

    



              2018   Radiologic exam  no information  no name      [facility]



                           abdomen 1 view            [facility] (67452)

 

    



              2018   Therapeutic  no information  no name      [facility]



                           prophylactic/dx           [facility]



                           injection subq/im         [place] (18646)

 

    



              2019   Urine pregnancy test  no information  no name      Co

[facility]



                           visual color cmprsn       [facility] (83604)

 

    



              2018   Urnls dip stick/tablet  no information  no name      

[facility]



                           rgnt auto w/o             [facility]



                           microscopy                [place] (50596)



                                                                                
                                                                             



Immunizations

                      



    



              Normalized   Immunization Date  Notes        Care Provider  Facili

ty



                                         Immunization    

 

    



              DEPO MEDROL 40 MG/ML  2018   no information  PRADEEP CHAMPAGNE 6

6762  

[facility] (16031)

 

    



              DEXAMETHASONE 4MG/ML  2018   no information  [facility] CHAMPAGNE 6

6762  

[facility]



                           (PER 1 MG)                [facility] (73252)

 

    



              influenza,   10-   no information  ZHANE DAY 33190  [facility]



                           injectable,               [facility]



                           quadrivalent,             [place] (03403)



                                         preservative free    

 

    



              influenza, seasonal,  10-   no information  no name      Co

[facility]



                           injectable                [facility]



                                         (35046)

 

    



              influenza, seasonal,  10- -  no information  ZHANE 

62  [facility]



                     injectable          10-          [facility]



                           Translations: [           [place] (20446)



                                         SINGLE IMMUNIZATION    



                                         ADMIN]    



                                                                                
                                     



Results

                      



     



            Test Name  Value      Interpretation  Reference Range  Date Time  Fa

cility



                     (Normalized)        (Normalized)        (Medline  



                                         Reference)  

 

 



                                         xray : kub (in



                                         house)



                                         on



                                         null

 

     



                 NEGATED:        no information  (no code)       [facility]

h



                           Highlighted row           Center of



                           Laboratory                [facility]



                           studies (set)             ()

 

 



                                         urinalysis



                                         on null

 

     



              Protein (U)  Negative     (no code)    0 - 20 mg/dL   [facility]



                           [Mass/Vol]                [facility]



                                         ()

 

 



                                         strep a (in



                                         house)



                                         on



                                         null

 

     



                 STREP A (IN     0480815         (no code)       [facility]

h



                           HOUSE)                    [facility]



                                         ()

 

     



                 STREP A (IN     2020 10 16      (no code)       [facility]

h



                           HOUSE)                    [facility]



                                         ()

 

 



                                         pregnancy test,



                                         urine (in house)



                                         on null

 

     



                 HCG (pregnancy  Negative        (no code)       [facility]

[facility]



                           test) Ql (U)              [facility]



                                         ()

 

     



                 HCG (pregnancy  3105652         (no code)       [facility]

h



                           test) Ql (U)              [facility]



                                         (74532)

 

     



                 HCG (pregnancy  +               (no code)       [facility]

h



                           test) Ql (U)              [facility]



                                         ()

 

     



                 HCG (pregnancy  2020          (no code)       [facility]

h



                           test) Ql (U)              [facility]



                                         (29814)

 

 



                                         other



                                         on



                                         null

 

     



                 BLO             Negative        (no code)       [facility]

[facility]



                                         (05151)

 

     



                 KET             2019~clear~ye  (no code)       [facility]



                           llow~none~negati          [facility]



                           ve~negative~nega          [facility]



                           tive                      (67210)

 

     



                 Lot #           [id number]          (no code)       [facility]

[facility]



                                         (73716)

 

     



                 SG              1.020           (no code)       [facility]

[facility]



                                         (80972)

 

     



                 URO             0.2             (no code)       [facility]

[facility]



                                         (61467)

 

 



                                         not yet



                                         categorized



                                         on 2020

 

     



                 Control         neg~neg~+       (no code)       [facility]

[facility]



                                         (94654)

 

     



                 Exp date        10/2022         (no code)       [facility]

[facility]



                                         (41833)

 

     



                 Lot #           [id number]         (no code)       [facility]

[facility]



                                         (31063)

 

 



                                         not yet



                                         categorized



                                         on 2020

 

     



                 Control         Negative        (no code)       [facility]

[facility]



                                         (34338)

 

     



                 Exp date        10/09/2021      (no code)       [facility]

[facility]



                                         (19318)

 

     



                 Lot #           [id number]         (no code)       [facility]

[facility]



                                         (54705)

 

 



                                         not yet



                                         categorized



                                         on 2020

 

     



                 Control         Negative        (no code)       [facility]

[facility]



                                         (86135)

 

     



                 Exp date                   (no code)       [facility]

[facility]



                                         (33864)

 

     



                 Lot #           [id number]         (no code)       [facility]

[facility]



                                         (68739)

 

 



                                         not yet



                                         categorized



                                         on 2019

 

     



                 Exp date        Negative        (no code)       [facility]

[facility]



                                         (27881)

 

 



                                         urinalysis



                                         on 2019

 

     



                 Bacteria        SEE NOTE        (no code)       [facility]





                           identified Cx             [facility] (U)                   [facility]



                                         (24336)

 

 



                                         other



                                         on



                                         2019

 

     



                 Exp date        +~2020        (no code)       [facility]

[facility]



                                         (19668)

 

     



                 Lot #           [id number]         (no code)       [facility]

[facility]



                                         (23801)

 

     



                 RESULTS         Negative        (no code)       [facility]



                                         (28330)

 

 



                                         urinalysis



                                         on 2018

 

     



                 Bacteria        SEE NOTE        (A)             [facility]





                           identified Cx             [facility]



                           Nom (U)                   [facility]



                                         (78433)

 

     



              Protein mass  Negative     (no code)    0 - 20 mg/dL   Community 

[facility]alth



                           conc (U)                  [facility]



                                         (32033)

 

 



                                         other



                                         on



                                         2018

 

     



                 BLO             2+              (no code)       [facility]

[facility]



                                         (40612)

 

     



                 Exp date        Negative        (no code)       [facility]

[facility]



                                         (78731)

 

     



                 KET             10 31           (no code)       [facility]





                           2018~clear~yello          [facility]



                           w~none~negative~          [facility]



                           negative~negativ          (26907)



                                         e    

 

     



                 Lot #           [id number]          (no code)       [facility]



                                         (25499)

 

     



                 SG              1.020           (no code)       [facility]



                                         (94327)

 

     



                 URO             0.2             (no code)       [facility]



                                         (13826)

 

 



                                         hematology



                                         on 2018

 

     



              pH (Bld)     6.0 [pH]     (no code)    7.38 - 7.42 [pH]   [facility]



                                         (39397)

 

 



                                         other



                                         on



                                         2018

 

     



                 Control         Negative        (no code)       [facility]



                                         (36300)

 

     



                 Exp date        2020 10 16      (no code)       [facility]



                                         (30946)

 

     



                 Lot #           [id number]         (no code)       [facility]



                                         (12366)

 

 



                                         other



                                         on



                                         2018

 

     



                 Control         Negative        (no code)       [facility]



                                         (48086)

 

     



                 Exp date        2020      (no code)       [facility]



                                         (76821)

 

     



                 Lot #           [id number]         (no code)       [facility]



                                         (56529)



                                                                                
                                                                                
                                                                                
                                                                                
                                                                                
                                                                                
        



Vital Signs

                      



      



           Vital Sign  Value     Interpretation  Reference  Date Time  Care Prov

ider  

Facility



                     (Normalized)        (Normalized)        Range   

 

      



           BMI (Body Mass  27.52 kg/m2  (no code)  15 - 25 kg/m2  2019  GEO GRANT 

 Community



                 Index)          12:      [address] [place] [address] (12354)

 

      



           BMI (Body Mass  27.05 kg/m2  (no code)  15 - 25 kg/m2  2018  GEO GRANT 

 Community



                 Index)          11:      [address] [place] [address] (59410)

 

      



           BMI (Body Mass  26.12 kg/m2  (no code)  15 - 25 kg/m2  2018  PA

DALTON  

Community



                 Index)          12:      LEE [address] [place] [address] (11523)

 

      



           BMI (Body Mass  25.94 kg/m2  (no code)  15 - 25 kg/m2  2018  ANGELA

CYN JOE  

Community



                 Index)          12:      MALVIN [address] [place] [address] (90382)

 

      



            BMI (Body Mass  26.3 kg/m2  (no code)  15 - 25 kg/m2  2018  MYRA JAMESONY

                                         Community



                 Index)          12:      [address]337           [facility] (90680)

 

      



            BMI (Body Mass  26.15 kg/m2  (no code)  15 - 25 kg/m2  2018  DEEDEE JAMESONY                                    Community



                 Index)          17:      04567           [facility] (18055)

 

      



            BMI (Body Mass  25.82 kg/m2  (no code)  15 - 25 kg/m2  2018  MATT

ROXANNA Sidman

                                         Community



                 Index)          13:      83219           [facility] (05420)

 

      



            BMI (Body Mass  25.69 kg/m2  (no code)  15 - 25 kg/m2  2018  MATT

ROXANNA Sidman

                                         Community



                 Index)          10:      [address] [place] [address] (60948)

 

      



           BMI (Body Mass  26 kg/m2  (no code)  15 - 25 kg/m2  2018  MICHAEL FAM Tyrone  

Community



                 Index)          12:      [address] [place] [address] (84577)

 

      



            BMI (Body Mass  25.72 kg/m2  (no code)  15 - 25 kg/m2  2018  CRICKET SCHULER                                  Community



                 Index)          08:      [address] ([address]071)

 

      



           Body height  172.72 cm  (no code)  cm        2014  ROXANN  Co

mmunity



                     13:100400          [address] (18219)

 

      



           Body height  172.72 cm  (no code)  cm        2014  Doctor    Co

mmunity



                     11:          [facility] (71794)

 

      



           Body height  173.99 cm  (no code)  cm        2013  TERRA     Co

mmunity



                     13:          [address] (11486)

 

      



           Body height  173.99 cm  (no code)  cm        2012  PALMIRA [facility]



                     [id number] (Other        Health Center



                           Phone:                    of [facility]



                           [phone])            [place] (39868)

 

      



           Body      97.1 [degF]  (no code)  97.8 - 99.0  2019  CRUZITO [facility]

G  Community



              Temperature    [degF]       12:   85669        Health Cente

r



                                         of [facility] (44971)

 

      



           Body      97.6 [degF]  (no code)  97.8 - 99.0  2018  CRUZITO KIN

G  Community



              Temperature    [degF]       11:   53369        Health Cente

r



                                         of [facility] (43795)

 

      



           Body      98.7 [degF]  (no code)  97.8 - 99.0  2018  ROXANN  

Community



              Temperature    [degF]       12:   [place] [address]  Health 

nter



                                         [facility] (37710)

 

      



           Body      98.8 [degF]  (no code)  97.8 - 99.0  2018  [facility]



              Temperature    [degF]       12:   [place] [address]  Health 

nter



                                         [facility] (66753)

 

      



           Body      98.7 [degF]  (no code)  97.8 - 99.0  2018  [facility]



              Temperature    [degF]       12:   38221        Health Cente

r



                                         of [facility] (22585)

 

      



           Body      97.2 [degF]  (no code)  97.8 - 99.0  2018  [facility]



              Temperature    [degF]       17:   80168        Health Cente

r



                                         [facility] (54188)

 

      



           Body      98.5 [degF]  (no code)  97.8 - 99.0  2018  [facility]



              Temperature    [degF]       13:   42373        Health Cente

r



                                         [facility] (04338)

 

      



           Body      98.3 [degF]  (no code)  97.8 - 99.0  2018  [facility]



              Temperature    [degF]       10:   81214        Health Cente

r



                                         of [facility] (70642)

 

      



           Body      98.5 [degF]  (no code)  97.8 - 99.0  2018  [facility]



              Temperature    [degF]       12:   35008        Health Cente

r



                                         [facility] (06226)

 

      



           Body      98.7 [degF]  (no code)  97.8 - 99.0  2018  TERRA MISTRY

[facility]



              Temperature    [degF]       08:   [facility] [address]  Health [facility]

ter



                                         of [facility] (93512)

 

      



           Body      98.2 [degF]  (no code)  97.8 - 99.0  2014  ROXANN  

[facility]



              temperature    [degF]       13:100400   HOWARD [address]  Health 

nter



                                         [facility] (00745)

 

      



           Body      97.8 [degF]  (no code)  97.8 - 99.0  2014  Doctor    

[facility]



              temperature    [degF]       11:   [facility]

r



                                         [facility] (18994)

 

      



           Body      98.6 [degF]  (no code)  97.8 - 99.0  2013  [facility]



              temperature    [degF]       13:   [facility] [address] [place] [address] (94225)

 

      



           Body      98.5 [degF]  (no code)  97.8 - 99.0  2012  [facility]



              temperature    [degF]       12:   61306 (Other  Health Cent

er



                           Phone:                    of [facility]



                           [phone])            [place] (31857)

 

      



           Body weight  82.1 kg   (no code)  kg        2019  [facility]



                     12:000400          53115               [facility] (30143)

 

      



           Body weight  69.72 kg  (no code)  kg        2014  ROXANN  Com

munity



                     13:          LEE [address] [place] [address] (88280)

 

      



           Body weight  69.49 kg  (no code)  kg        2014  Doctor    Com

munity



                     11:310500          Migration           [facility] (80292)

 

      



           Body weight  69.45 kg  (no code)  kg        2013  TERRA     Com

munity



                     13:400500          [facility] [id number] [place] [address] (57281)

 

      



           Body weight  72.38 kg  (no code)  kg        2012  [facility]



                     12:0400          26776 (Other        Health Center



                           Phone:                    of [facility]



                           [phone])            [place] (56867)

 

      



           Height    172.72 cm  (no code)  cm        2019  CRUZITO GRANT  Co

mmunity



                     12:          [address] [place] [address] (26333)

 

      



           Height    172.72 cm  (no code)  cm        2018  CRUZITO GRANT  Co

mmunity



                     11:200500          [address] [place] [address] (43896)

 

      



           Height    172.72 cm  (no code)  cm        2018  ROXANN Bestu

nity



                     12:          LEE [address] [place] [address] (45244)

 

      



           Height    172.72 cm  (no code)  cm        2018  CHERRY Best

unity



                     12:050400          MALVIN [address] [place] [address] (15906)

 

      



           Height    172.72 cm  (no code)  cm        2018  [facility]



                     12:          [address] [place] [address] (53065)

 

      



           Height    172.72 cm  (no code)  cm        2018  [facility]



                     17:          [address] [place] [address] (90555)

 

      



           Height    172.72 cm  (no code)  cm        2018  [facility]  

ommunity



                     13:          [address] [place] [address] ([id number])

 

      



           Height    172.72 cm  (no code)  cm        2018  [facility]  

om[facility]ity



                     10:          [address] [place] [address] ([id number])

 

      



           Height    172.72 cm  (no code)  cm        2018  [facility]



                     12:          [address] [place] [address] ([id number])

 

      



           Height    172.72 cm  (no code)  cm        2018  [facility]



                     08:          [address] ([id number])

 

      



           Weight    80.7 kg   (no code)  kg        2018  CRUZITO GRANT  Com

munity



                     11:200500          [address] [place] [address] ([id number])

 

      



           Weight    77.93 kg  (no code)  kg        2018  ROXANN  Commun

ity



                     12:          LEE [address]      [facility] ([id number])

 

      



           Weight    77.38 kg  (no code)  kg        2018  CHERRY [facility]ney



                     12:          COOMBS [address]      [facility] ([id number])

 

      



           Weight    78.47 kg  (no code)  kg        2018  PRADEEP CHAMPAGNE  C

ommunity



                     12:          [id number] [place] [address] ([id number])

 

      



           Weight    78.02 kg  (no code)  kg        2018  PRADEEP JAMESONY  C

ommunity



                     17:          [id number] [place] [address] ([id number])

 

      



           Weight    77.02 kg  (no code)  kg        2018  JAIMA MITCHELL  Co

mmunity



                     13:          [address] [place] [address] ([id number])

 

      



           Weight    76.66 kg  (no code)  kg        2018  JAIMA MITCHELL  Co

mmunity



                     10:          [address] [place] [address] ([id number])

 

      



           Weight    77.57 kg  (no code)  kg        2018  PRADEEP JAMESONY  C

ommunity



                     12:          [id number] [place] [address] ([id number])

 

      



           Weight    76.75 kg  (no code)  kg        2018  TERRA SCHULER  C

ommunity



                     08:          SONAM [address] [place] [address] ([id number])



                                                                                
                                                                                
                                                                                
                                                                                
                                                                                
                                                                                
                                                                                
                            



Interventions

          No Information                                                        
  



Plan of Treatment

                      



    



              Normalized Care  Care Detail  Care Activity Date  Care Provider  F

acility



                                         Activity    

 

    



              (ACUTE) Acute Visit  [facility]  2018   CRUZITO GRANT 66

762  [facility] ([id number])

 

    



              (IPT) Internal PCP  [facility]  10-   ZHANE DAY 6676

2  [facility] ([id number])



                                                                                
        



Goals

          No Information                                                        
  



Social History

          No Information                                                        
  



Functional Status

                      The data below is from unstructured sources            



                    Query                   Response                   Date [place]

rded                

 

                          Comprehension Ability                   Understands Co

ncepts                    

                                        2017 6:56pm                



                                                                    



Mental Status

          No Information                                                        
  



Encounters

                      



    



              Encounter    Normalized Encounter  Encounter Diagnosis  Care Provi

li  

Organization



                           Date                      Type   

 

    



              2020   [facility]  Major depressive  CARMEN JAQUELINE (no  [facility]

EK [facility] (no 

phone)



                           disorder, recurrent,      phone) 



                                         moderate  

 

    



              2020   [facility]  Viral infection,  CRUZITO GRANT (n

o phone)  

[facility]



                     CARE                unspecified         CARE (no phone)

 

    



              2020   [facility]  Influenza due to other  CHELO B

ANDREA (no  

[facility]



                 CARE            identified influenza  phone)          CARE (no 

phone)



                                         virus with other  



                                         respiratory  



                                         manifestations  

 

    



              2020   [facility]  Other general symptoms  DANA OR

ELLANA (no  

[facility]



                 CARE            and signs       phone)          CARE (no phone)

 

    



              2020   [facility]  Encounter for  CRUZITO GRANT (no

 phone)  

[facility]



                           contraceptive             (no phone)



                                         management,  



                                         unspecified  

 

    



              NEGATED      Emergency department  no information  no name      no

 organization name



                           2017                patient visit   



                                         -    



                                         2017    

 

    



              NEGATED      Patient encounter  no information  no name      no or

ganization name



                                         2018   Patient encounter  no information  no name      no or

ganization name

 

    



              2018   Patient encounter  no information  no name      no or

ganization name

 

    



              2018   Patient encounter  no information  no name      no or

ganization name

 

    



              2018   Patient encounter  no information  no name      no or

ganization name

 

    



              NEGATED      Patient encounter  no information  no name      no or

ganization name



                                         2017    



                                         -    



                                         2017   Patient encounter  no information  no name      no or

ganization name

 

    



              2017   Patient encounter  no information  no name      no or

ganization name

 

    



              NEGATED      Patient encounter  no information  no name      no or

ganization name



                                         2017    

 

    



              NEGATED      Patient encounter  no information  no name      no or

ganization name



                                         2017   Patient encounter  no information  no name      no or

ganization name

 

    



              2017   Patient encounter  no information  no name      no or

ganization name



                                         -    



                                         2017   Patient encounter  no information  no name      no or

ganization name



                                         -    



                                         2017   Patient encounter  no information  no name      no or

ganization name

 

    



              2015   Patient encounter  no information  no name      no or

ganization name

 

    



                 Patient encounter  no information  no name         no organizat

ion name

 

    



              2020   Patient encounter  no information  (no phone)   [facility] (no phone)

 

    



              2020   Patient encounter  no information  CRUZITO GRANT (no 

 [facility]



                     procedure           phone)              [facility] (no phone)

 

    



              2020   Patient encounter  no information  no name      no or

ganization name



                                         procedure   

 

    



              2020   Patient encounter  no information  no name      no or

ganization name



                                         procedure   

 

    



              2019   Patient encounter  no information  no name      no or

ganization name



                                         procedure   

 

    



              10-   Patient encounter  no information  no name      no or

ganization name



                                         procedure   

 

    



              2019   Patient encounter  no information  no name      no or

ganization name



                                         procedure   

 

    



              2019   Patient encounter  no information  no name      no or

ganization name



                                         procedure   

 

    



              2017   Patient encounter  no information  no name      no or

ganization name



                                         procedure   

 

    



              2017   Patient encounter  no information  no name      no or

ganization name



                                         procedure   

 

    



              2017   Patient encounter  no information  no name      no or

ganization name



                                         procedure   

 

    



              2017   Patient encounter  no information  no name      no or

ganization name



                                         procedure   

 

    



              2017   Patient encounter  no information  no name      no or

ganization name



                                         procedure   

 

    



              2016   Patient encounter  no information  no name      no or

ganization name



                                         procedure   

 

    



              2020   Telephone encounter  no information  CRUZITO JUANITA (no 

phone)  [facility]



                                         (no phone)

 

    



              2020   Telephone encounter  no information  CRUZITO JUANITA (no 

phone)  [facility]



                                         (no phone)

 

    



                 no information  Encounter for other  no name         no organiz

ation name



                                         preprocedural  



                                         examination  

 

    



              NEGATED      no information  Encounter for other  no name      no 

organization name



                                         preprocedural  



                                         examination  

 

    



                 no information  Encounter for   no name         no organization

 name



                                         preprocedural  



                                         laboratory examination  



                                                                                
                                                                                
                                                                                
                                                                                
                                                                                
                                                                              



Medical Equipment

          No Information                                                        
  



Payers

          No Information                                                        
  



Summary Purpose

          eClinicalWorks SubmissioneClinicalWorks SubmissioneClinicalWorks 
SubmissioneClinicalWorks SubmissioneClinicalWorks SubmissioneClinicalWorks 
SubmissioneClinicalWorks SubmissioneClinicalWorks SubmissioneClinicalWorks 
SubmissioneClinicalWorks SubmissioneClinicalWorks Submission                    
                                      



Advance Directives

                      



                    Directive                   Response                   Recor

ded Date/Time       

         

 

                    Advance Directives                   No                    8:37am       

         

 

                    Resuscitation Status                   Full Code            

       17 

8:37am                



            



                    Directive                   Response                   Recor

ded Date/Time       

         

 

                    Advance Directives                   No                    8:49am       

         

 

                    Health Care Power of                    No          

         17 

8:49am                

 

                    Organ Donor                   No                   17 

8:49am              

  

 

                    Resuscitation Status                   Full Code            

       17 

8:49am                



            



                    Directive                   Response                   Recor

ded Date/Time       

         

 

                    Advance Directives                   No                    11:10am      

          

 

                    Health Care Power of                    No          

         17 

11:10am                

 

                    Organ Donor                   No                   17 

11:10am             

   

 

                    Resuscitation Status                   Full Code            

       17 

11:10am                



            



                    Directive                   Response                   Recor

ded Date/Time       

         

 

                    Advance Directives                   No                    6:56pm       

         

 

                    Health Care Power of                    No          

         17 

6:56pm                

 

                    Organ Donor                   No                   17 

6:56pm              

  

 

                    Resuscitation Status                   Full Code            

       17 

6:56pm                



                                                                    



Discharge Instructions

          No hospital discharge instructions.                          

Patient Instructions              

Physician Instructions              

              

Follow Up Appt in 2 weeks              

              

Activity as tolerated              

No driving for 24 hours              

No driving while on pain medications              

              

Incentive Spirometry use every 2 hours while awake              

              

High Fiber Diet 25g or more per day              

              

Avoid Alcohol, Caffeine, Spicy Greasy and Acid foods.              

              

Drink 64 fluid oz or more of fluids per day.              

              

Symptoms to Report: Fever over 101 degree F, Nausea/Vomiting               

If any problems/questions: Contact your physician or go to Emergency Room       
       

              

                                      

Patient Instructions              

Physician Instructions              

              

New, Converted, or Re-Newed RX: RX on Chart              

Follow Up Appt in 2 weeks              

              

Activity as tolerated              

              

High Fiber Diet 25g or more per day              

              

Avoid Alcohol, Caffeine, Spicy Greasy and Acid foods.              

              

Drink 64 fluid oz or more of fluids per day.              

              

Symptoms to Report: Fever over 101 degree F, Nausea/Vomiting               

If any problems/questions: Contact your physician or go to Emergency Room       
       

              

            No hospital discharge instruction information available.            
                                              



Additional Source Comments

          This clinical document has been generated using langtaojin 
software that has been certified by the Office of the National Coordinator for 
Health Information Technology (ONC 15.99.04.3023.Diam.31.00.0.478443) and the 
National Committee for  (NCQA, as an eMeasure certified 
technology).            

            

FOR RECORDS PERTAINING TO PATIENTS WHO ARE OR HAVE BEEN ENROLLED IN A CHEMICAL D
EPENDENCY/SUBSTANCE ABUSE PROGRAM, SOME INFORMATION MAY BE OMITTED. This clinica
l summary was aggregated from multiple sources.  Caution should be exercised in 
using it in the provision of clinical care. This summary normalizes information 
from multiple sources, and as a consequence, information in this document may ma
terially change the coding, format and clinical context of patient data. In tiago
tion, data may be omitted in some cases. CLINICAL DECISIONS SHOULD BE BASED ON T
HE PRIMARY CLINICAL RECORDS. [employer]). provides no warranty or guara
ntee of the accuracy or completeness of information in this document.The followi
ng information is based on time limited clinical information            



                                        UNRECOGNIZED CONTENT PROVIDED BELOW FOR 

UNRECOGNIZED SECTION MEDICAL (GENERAL) 

HISTORY                

 

                                                         



            



                    Type                   Description                   Date   

             

 

                    Medical History                   Anxiety                   

                 

 

                    Medical History                   hypertension              

                    

  

 

                    Medical History                   gastroenteritis           

                    

     

 

                    Surgical History                   wisdom teeth extraction  

                    

              

 

                    Surgical History                   cholecsytectomy          

         2017   

             

 

                    Surgical History                   Colonoscopy, EGD         

          2017   

             

 

                    Hospitalization History                   child birth       

                    

         



            



                    Type                   Description                   Date   

             

 

                    Medical History                   Anxiety                   

                 

 

                    Medical History                   hypertension              

                    

  

 

                    Medical History                   gastroenteritis           

                    

     

 

                    Medical History                   IBS                       

             

 

                    Surgical History                   wisdom teeth extraction  

                    

              

 

                    Surgical History                   cholecsytectomy          

         2017   

             

 

                    Surgical History                   Colonoscopy, EGD         

          2017   

             

 

                    Hospitalization History                   child birth       

                    

         

 

                          Hospitalization History                   Possible radha

endicitis                 

                                        2017                



            



                                        UNRECOGNIZED CONTENT PROVIDED BELOW FOR 

UNRECOGNIZED SECTION REASON FOR VISIT   

             

 

                                                         



Controlled Med Refillsore throat/fever that started yesterday. carlos gonzales...was here 3 days ago for this same complaint...started on prednisone
. pt reports she is taking this medication. this is day 3 of the steroid. derian guzmán, instructed pt to fu with pradeep champagne next week. increase fluids, continu
e with current rx's, et get plenty of rest. pt verbalized 
understandingcongestion/ear ache, sore throat and headache started Sat 
JStrasserRNwas dx with UTI with hematuria on monday. has finished appropriate 
antibiotics according to urine cx. still feels nauseated, headache, denies 
dysuria. feels like there is a knot in her LUQ. aniket LMP ended 
productive cough, runny nose, ear discomfort (bilaterally), chest 
congestion x 1.5 wks. Pt was seen last Tuesday with same symptoms. Pt was rx 
nasal spray and steroids. She states that she does not feel any better after 
previous treatment and that her symptoms are worse.-awoodsMedication questionref
ill requestmed f/u ALIYA SUMNER-Migmed f/u -- rajeev hinkle, patient states she
 is traveling in may to [place] and would like to have a physiscal Medication Ref
illRefill request

## 2020-07-01 NOTE — XMS REPORT
Continuity of Care Document

                             Created on: 2020



TABBY BETHEA

External Reference #: [id number]

: 1980

Sex: Female



Demographics





                          Address                   [address]

[place]  [address]

 

                          Home Phone                [phone] x

 

                          Preferred Language        Unknown

 

                          Marital Status            Unknown

 

                          Episcopal Affiliation     Unknown

 

                          Race                      Unknown

 

                          Ethnic Group              Unknown





Author





                          Organization              Unknown

 

                          Address                   Unknown

 

                          Phone                     Unavailable



              



Allergies

      



             Active           Description           Code           Type         

  Severity   

                Reaction           Onset           Reported/Identified          

 

Relationship to Patient                 Clinical Status        

 

             Yes           amoxicillin           [id number]           Drug Aller

gy           

Unknown           HAS TAKEN PENIC                           06/10/2008          

    

                                                 

 

             Yes           amoxicillin                        Drug Allergy      

     N/A      

             N/A                        2009                              

   

 

           Yes           Mobic                       Drug Allergy           N/A 

          

N/A                             2010                                    



                      



Medications

      



There is no data.                  



Problems

      



             Date Dx Coded           Attending           Type           Code    

       

Diagnosis                               Diagnosed By        

 

             2009                                     477.9           CADEN

RGIC RHINITIS 

CAUSE UNSPECIFIED                                

 

             2009           ZHANE DAY DO                        477.9 

          

ALLERGIC RHINITIS CAUSE UNSPECIFIED                    

 

                2009           TERRA KEVIN              

             477.9  

                          ALLERGIC RHINITIS CAUSE UNSPECIFIED                   

 

 

             2009           ZHANE DAY DO                        477.9 

          

ALLERGIC RHINITIS CAUSE UNSPECIFIED                    

 

                2009           ROXANN CONWAY R                  

         477.9      

                          ALLERGIC RHINITIS CAUSE UNSPECIFIED                   

 

 

             2009           PALMIRA OLIVA                        47

7.9           

ALLERGIC RHINITIS CAUSE UNSPECIFIED                    

 

                2009           ROXANN CONWAY R                  

         477.9      

                          ALLERGIC RHINITIS CAUSE UNSPECIFIED                   

 

 

             2009           ZHANE DAY DO K                        477.9 

          

ALLERGIC RHINITIS CAUSE UNSPECIFIED                    

 

             2009           ZHANE DAY DO K                        477.9 

          

ALLERGIC RHINITIS CAUSE UNSPECIFIED                    

 

             2010                                     959.4           INJU

RY, OTHER AND 

UNSPECIFIED, HAND, EXCEPT FINGER                    

 

             2010           ZHANE DAY DO K                        959.4 

          

INJURY, OTHER AND UNSPECIFIED, HAND, EXCEPT FINGER                    

 

                2010           TERRA KEVIN N              

             959.4  

                          INJURY, OTHER AND UNSPECIFIED, HAND, EXCEPT FINGER    

                

 

             2010           ZHANE DAY DO K                        959.4 

          

INJURY, OTHER AND UNSPECIFIED, HAND, EXCEPT FINGER                    

 

                2010           ROXANN CONWAY R                  

         959.4      

                          INJURY, OTHER AND UNSPECIFIED, HAND, EXCEPT FINGER    

                

 

             2010           PALMIRA OLIVA                        95

9.4           

INJURY, OTHER AND UNSPECIFIED, HAND, EXCEPT FINGER                    

 

                2010           ROXANN CONWAY R                  

         959.4      

                          INJURY, OTHER AND UNSPECIFIED, HAND, EXCEPT FINGER    

                

 

             2010           DAY DO, ZHANE K                        959.4 

          

INJURY, OTHER AND UNSPECIFIED, HAND, EXCEPT FINGER                    

 

             2010           DAY DO, ZHANE K                        959.4 

          

INJURY, OTHER AND UNSPECIFIED, HAND, EXCEPT FINGER                    

 

             2010                                     599.0           URIN

CLARY TRACT 

INFECTION, SITE NOT SPECIFIED                    

 

             2010           DAY DO, ZHANE K                        599.0 

          

URINARY TRACT INFECTION, SITE NOT SPECIFIED                    

 

                2010           TERRA KEVIN N              

             599.0  

                          URINARY TRACT INFECTION, SITE NOT SPECIFIED           

         

 

             2010           DAY DO, ZHANE K                        599.0 

          

URINARY TRACT INFECTION, SITE NOT SPECIFIED                    

 

                2010           ROXANN CONWAY R                  

         599.0      

                          URINARY TRACT INFECTION, SITE NOT SPECIFIED           

         

 

             2010           PALMIRA OLIVA                        59

9.0           

URINARY TRACT INFECTION, SITE NOT SPECIFIED                    

 

                2010           ROXANN CONWAY R                  

         599.0      

                          URINARY TRACT INFECTION, SITE NOT SPECIFIED           

         

 

             2010           DAY DO, ZHANE K                        599.0 

          

URINARY TRACT INFECTION, SITE NOT SPECIFIED                    

 

             2010           DAY DO, ZHANE K                        599.0 

          

URINARY TRACT INFECTION, SITE NOT SPECIFIED                    

 

             2011                        Ot           642.31           TRA

NS HYPERTEN-

DELIVERED                                        

 

             2011                        Ot           664.11           DEL

 W 2 DEG 

LACERAT-DEL                                      

 

             2011                        Ot           V27.0           DELI

ESAU-SINGLE 

LIVEBORN                                         

 

           2012                                   078.10           WARTS  

           

      

 

             2012           DAY DOBENTONA K                        078.10

           

WARTS                                            

 

                2012           TERRA KEVIN N              

             078.10 

                          WARTS                              

 

             2012           DAY DO, ZHANE K                        078.10

           

WARTS                                            

 

                2012           ROXANN CONWAY R                  

         078.10     

                          WARTS                              

 

                2012           PALMIRA OLIVA                       

    078.10          

                          WARTS                              

 

                2012           ROXANN CONWAY R                  

         078.10     

                          WARTS                              

 

             2012           DAY DO, ZHANE K                        078.10

           

WARTS                                            

 

             2012           DAY DO, ZHANE K                        078.10

           

WARTS                                            

 

             10/16/2012                                     V04.81           FLU

 DX (3 YRS AND 

ABOVE, IM)                                       

 

             10/16/2012           SHAY DO, ZHANE K                        V04.81

           FLU

DX (3 YRS AND ABOVE, IM)                         

 

                10/16/2012           HARINI MASTERS APRCRICKET TERRA N              

             V04.81 

                          FLU DX (3 YRS AND ABOVE, IM)                    

 

             10/16/2012           SHAY DO, ZHANE K                        V04.81

           FLU

DX (3 YRS AND ABOVE, IM)                         

 

                10/16/2012           ROXANN CONWAY R                  

         V04.81     

                          FLU DX (3 YRS AND ABOVE, IM)                    

 

                10/16/2012           PALMIRA OLIVA                       

    V04.81          

                          FLU DX (3 YRS AND ABOVE, IM)                    

 

                10/16/2012           ROXANN CONWAY R                  

         V04.81     

                          FLU DX (3 YRS AND ABOVE, IM)                    

 

             10/16/2012           DAY DO, ZHANE K                        V04.81

           FLU

DX (3 YRS AND ABOVE, IM)                         

 

             10/16/2012           DAY DO, ZHANE K                        V04.81

           FLU

DX (3 YRS AND ABOVE, IM)                         

 

           2013                                   784.0           HEADACHE

           

        

 

             2013           DAY DO, ZHANE K                        784.0 

          

HEADACHE                                         

 

                2013           SCHULER SONAM PATELCRICKET, TERRA N              

             784.0  

                          HEADACHE                           

 

             2013           DAY DO, ZHANE K                        784.0 

          

HEADACHE                                         

 

                2013           LEE CRABTREE, ROXANN R                  

         784.0      

                          HEADACHE                           

 

             2013           PALMIRA OLIVA                        78

4.0           

HEADACHE                                         

 

                2013           SHAILA CONWAYIA R                  

         784.0      

                          HEADACHE                           

 

             2013           DAY DO, ZHANE K                        784.0 

          

HEADACHE                                         

 

             2013           DAY DO, ZHANE K                        784.0 

          

HEADACHE                                         

 

             2013                                     784.91           POS

TNASAL DRIP    

                                                 

 

             2013           DAY DO, ZHANE K                        784.91

           

POSTNASAL DRIP                                   

 

                2013           TERRA KEVIN N              

             784.91 

                          POSTNASAL DRIP                     

 

             2013           DAY DO, ZHANE K                        784.91

           

POSTNASAL DRIP                                   

 

                2013           HAFSA CONWAYRICIA R                  

         784.91     

                          POSTNASAL DRIP                     

 

                2013           PALMIRA OLIVA                       

    784.91          

                          POSTNASAL DRIP                     

 

                2013           SHAILA CONWAYIA R                  

         784.91     

                          POSTNASAL DRIP                     

 

             2013           DAY DO, ZHANE K                        784.91

           

POSTNASAL DRIP                                   

 

             2013           DAY DO, ZHANE K                        784.91

           

POSTNASAL DRIP                                   

 

                2013           HARINI CRABTREE, TERRA N              

             784.42 

                          DYSPHONIA                          

 

                2013           SCHULERERIKA CRABTREE, TERRA N              

             786.2  

                          COUGH                              

 

             2013           DAY DO, ZHANE K                        784.42

           

DYSPHONIA                                        

 

             2013           DAY DO, ZHANE K                        786.2 

          

COUGH                                            

 

                2013           HAFSA CONWAYRICIA R                  

         784.42     

                          DYSPHONIA                          

 

                2013           LEE CRABTREE, ROXANN R                  

         786.2      

                          COUGH                              

 

                2013           PALMIRA OLIVA                       

    784.42          

                          DYSPHONIA                          

 

             2013           PALMIRA OLIVA                        78

6.2           

COUGH                                            

 

                2013           HAFSA CONWAYRICIA R                  

         784.42     

                          DYSPHONIA                          

 

                2013           LEE CRABTREE ROXANN R                  

         786.2      

                          COUGH                              

 

             2013           DAY DO, ZHANE K                        784.42

           

DYSPHONIA                                        

 

             2013           DAY DO, ZHANE K                        786.2 

          

COUGH                                            

 

             2013           DAY DO, ZHANE K                        784.42

           

DYSPHONIA                                        

 

             2013           DAY DO, ZHANE K                        786.2 

          

COUGH                                            

 

             2014           DAY DO, ZHANE K                        462   

        ACUTE 

PHARYNGITIS                                      

 

                2014           SHAILA CONWAYIA R                  

         462        

                          ACUTE PHARYNGITIS                    

 

             2014           PALMIRA OLIVA                        46

2           

ACUTE PHARYNGITIS                                

 

                2014           ROXANN CONWAY R                  

         462        

                          ACUTE PHARYNGITIS                    

 

             2014           DAY DO, ZHANE K                        462   

        ACUTE 

PHARYNGITIS                                      

 

             2014           DAY DO, ZHANE K                        462   

        ACUTE 

PHARYNGITIS                                      

 

                2014           SHAILA CONWAYIA R                  

         372.30     

                          CONJUNCTIVITIS UNSPECIFIED                    

 

                2014           PALMIRA OLIVA                       

    372.30          

                          CONJUNCTIVITIS UNSPECIFIED                    

 

                2014           ROXANN CONWAY R                  

         372.30     

                          CONJUNCTIVITIS UNSPECIFIED                    

 

             2014           DAY DO, ZHANE K                        372.30

           

CONJUNCTIVITIS UNSPECIFIED                       

 

             2014           DAY DO, ZHANE K                        372.30

           

CONJUNCTIVITIS UNSPECIFIED                       

 

             04/15/2014           PALMIRA OLIVA                        70

1.9           

SKIN TAG                                         

 

                04/15/2014           SHAILA CONWAYIA R                  

         701.9      

                          SKIN TAG                           

 

             04/15/2014           DAY DO, ZHANE K                        701.9 

          SKIN

TAG                                              

 

             04/15/2014           DAY DO, ZHANE K                        701.9 

          SKIN

TAG                                              

 

                2014           SHAILA CONWAYIA R                  

         787.02     

                          NAUSEA ALONE                       

 

                2014           HOWARD APRHAFSA HARLEYROXANN R                  

         787.91     

                          DIARRHEA                           

 

             2014           DAY DO, ZHANE K                        787.02

           

NAUSEA ALONE                                     

 

             2014           DAY DO, ZHANE K                        787.91

           

DIARRHEA                                         

 

             2014           DAY DO, ZHANE K                        787.02

           

NAUSEA ALONE                                     

 

             2014           DAY DO, ZHANE K                        787.91

           

DIARRHEA                                         

 

             2014           DAY DO, ZHANE K                        V74.1 

          

SCREENING EXAMINATION FOR PULMONARY TUBERCULOSIS                    

 

             2014           DAY DO, ZHANE K                        V74.1 

          

SCREENING EXAMINATION FOR PULMONARY TUBERCULOSIS                    

 

                2015           HUSSEIN ARIZA ARNP           Ot      

        729.5      

                                                             

 

                2016           HUSSEIN ARIZA A ARNP           Ot      

        729.5      

                          PAIN IN LIMB                       

 

                2016           NIKOLAI ARIZAE A ARNP           Ot      

        729.5      

                          PAIN IN LIMB                       

 

                2016           HUSSEIN ARIZA A ARNP           Ot      

        R10.2      

                          PELVIC AND PERINEAL PAIN                    

 

                2016           HUSSEIN ARIZA A ARNP           Ot      

        R10.2      

                          PELVIC AND PERINEAL PAIN                    

 

                2016           NIKOLAI ARIZAE A ARNP           Ot      

        R10.2      

                          PELVIC AND PERINEAL PAIN                    

 

                2016           NIKOLAI ARIZAE A ARNP           Ot      

        R10.2      

                          PELVIC AND PERINEAL PAIN                    

 

                2016           NIKOLAI ARIZAE A ARNP           Ot      

        R10.84     

                          GENERALIZED ABDOMINAL PAIN                    

 

                2016           NIKOALI ARIZAE A ARNP           Ot      

        R10.84     

                          GENERALIZED ABDOMINAL PAIN                    

 

             2017           AP ROWE MD           Ot           K82.8 

          

OTHER SPECIFIED DISEASES OF GALLBLADDER                    

 

             2017           AP ROWE MD           Ot           Z01.81

2           

ENCOUNTER FOR PREPROCEDURAL LABORATORY E                    

 

             2017           AP ROWE MD, Ot           Z11.2 

          

ENCOUNTER FOR SCREENING FOR OTHER BACTER                    

 

             2017           AP ROWE MD           Ot           K81.1 

          

CHRONIC CHOLECYSTITIS                            

 

             2017           KIDO MD, TAKAAKI           Ot           K81.1 

          

CHRONIC CHOLECYSTITIS                            

 

                2017           HUSSEIN ARIZA ARNP           Ot      

        R10.84     

                          GENERALIZED ABDOMINAL PAIN                    

 

             2017           AP ROWE MD           Ot           K81.1 

          

CHRONIC CHOLECYSTITIS                            

 

             2017           AP ROWE MD           Ot           K81.1 

          

CHRONIC CHOLECYSTITIS                            

 

             2017           AP ROWE MD           Ot           K59.09

           

OTHER CONSTIPATION                               

 

             2017           AP ROWE MD           Ot           R11.2 

          

NAUSEA WITH VOMITING, UNSPECIFIED                    

 

             2017           AP ROWE MD           Ot           Z01.81

8           

ENCOUNTER FOR OTHER PREPROCEDURAL EXAMIN                    

 

             2017           AP ROWE MD           Ot           K21.0 

          

GASTRO-ESOPHAGEAL REFLUX DISEASE WITH ES                    

 

             2017           AP ROWE MD           Ot           K29.60

           

OTHER GASTRITIS WITHOUT BLEEDING                    

 

             2017           AP ROWE MD           Ot           K44.9 

          

DIAPHRAGMATIC HERNIA WITHOUT OBSTRUCTION                    

 

             2017           AP ROWE MD           Ot           K63.5 

          

POLYP OF COLON                                   

 

             2017           AP ROWE MD           Ot           K21.0 

          

GASTRO-ESOPHAGEAL REFLUX DISEASE WITH ES                    

 

             2017           AP ROWE MD           Ot           K29.60

           

OTHER GASTRITIS WITHOUT BLEEDING                    

 

             2017           AP ROWE MD           Ot           K44.9 

          

DIAPHRAGMATIC HERNIA WITHOUT OBSTRUCTION                    

 

             2017           AP ROWE MD           Ot           K63.5 

          

POLYP OF COLON                                   

 

             03/15/2017           AP ROWE MD           Ot           K21.0 

          

GASTRO-ESOPHAGEAL REFLUX DISEASE WITH ES                    

 

             03/15/2017           AP ROWE MD           Ot           K29.60

           

OTHER GASTRITIS WITHOUT BLEEDING                    

 

             03/15/2017           AP ROWE MD           Ot           K44.9 

          

DIAPHRAGMATIC HERNIA WITHOUT OBSTRUCTION                    

 

             03/15/2017           AP ROWE MD           Ot           K63.5 

          

POLYP OF COLON                                   

 

             2017           AP ROWE MD           Ot           K59.09

           

OTHER CONSTIPATION                               

 

             2017           AP ROWE MD           Ot           K59.09

           

OTHER CONSTIPATION                               

 

                2017           HUSSEIN ARIZA ARNP           Ot      

        729.5      

                          PAIN IN LIMB                       

 

                2017           HUSSEIN ARIZA ARNP           Ot      

        R10.2      

                          PELVIC AND PERINEAL PAIN                    

 

                2017           HUSSEIN ARIZA ARNP           Ot      

        R10.2      

                          PELVIC AND PERINEAL PAIN                    

 

                2017           HUSSEIN ARIZA ARNP           Ot      

        R10.84     

                          GENERALIZED ABDOMINAL PAIN                    

 

             2017           JAE CONTRERAS, AP           Ot           K59.09

           

OTHER CONSTIPATION                               

 

             2017           JAE CONTRERAS, AP           Ot           R11.2 

          

NAUSEA WITH VOMITING, UNSPECIFIED                    

 

             2017           JAE CONTRERAS, AP           Ot           Z01.81

8           

ENCOUNTER FOR OTHER PREPROCEDURAL EXAMIN                    

 

             2017           KEYANA, DORIS ARNP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 neutrophils/100 leukocytes           57 %   

             42-75       

 

 

                    Automated blood lymphocytes/100 leukocytes           32 %   

             12-44       

 

 

                    Blood monocytes/100 leukocytes           7 %                

 0-12        

 

                    Automated blood eosinophils/100 leukocytes           3 %    

             0-10        

 

                    Automated blood basophils/100 leukocytes           1 %      

           0-10        

 

                    Blood neutrophils automated count (number/volume)           

4.4 10*3            

1.8-7.8        

 

                    Blood lymphocytes automated count (number/volume)           

2.4 10*3            

1.0-4.0        

 

                    Blood monocytes automated count (number/volume)           0.

6 10*3            

0.0-1.0        

 

                    Automated eosinophil count           0.3 10*3/uL           0

.0-0.3        

 

                    Automated blood basophil count (count/volume)           0.1 

10*3/uL           

0.0-0.1        

 

                                        Comprehensive metabolic panel - 17

 18:20         

 

                          Serum or plasma sodium measurement (moles/volume)     

      138 mmol/L          

                                        135-145        

 

                          Serum or plasma potassium measurement (moles/volume)  

         4.1 mmol/L       

                                        3.6-5.0        

 

                          Serum or plasma chloride measurement (moles/volume)   

        105 mmol/L        

                                                

 

                    Carbon dioxide           23 mmol/L           21-32        

 

                          Serum or plasma anion gap determination (moles/volume)

           10 mmol/L      

                                        5-14        

 

                          Serum or plasma urea nitrogen measurement (mass/volume

)           11 mg/dL      

                                        7-18        

 

                          Serum or plasma creatinine measurement (mass/volume)  

         0.75 mg/dL       

                                        0.60-1.30        

 

                    Serum or plasma urea nitrogen/creatinine mass ratio         

  15                  NRG 

       

 

                                        Serum or plasma creatinine measurement w

ith calculation of estimated glomerular 

filtration rate           >                         NRG        

 

                    Serum or plasma glucose measurement (mass/volume)           

99 mg/dL            

        

 

                    Serum or plasma calcium measurement (mass/volume)           

9.3 mg/dL           

8.5-10.1        

 

                          Serum or plasma total bilirubin measurement (mass/volu

me)           0.4 mg/dL   

                                        0.1-1.0        

 

                                        Serum or plasma alkaline phosphatase aram

surement (enzymatic activity/volume)    

                          89 U/L                            

 

                                        Serum or plasma aspartate aminotransfera

se measurement (enzymatic 

activity/volume)           15 U/L                    5-34        

 

                                        Serum or plasma alanine aminotransferase

 measurement (enzymatic activity/volume)

                          15 U/L                    0-55        

 

                    Serum or plasma protein measurement (mass/volume)           

7.6 g/dL            

6.4-8.2        

 

                    Serum or plasma albumin measurement (mass/volume)           

4.2 g/dL            

3.2-4.5        

 

                                        CULTURE, URINE - 18 11:39         

 

                    CULTURE, URINE, ROUTINE           SEE NOTE            NRG   

     

 

                                        TSH - 10/18/18 09:45         

 

                    TSH                 1.28 mIU/L           NRG        

 

                                        CULTURE, URINE - 19 17:54         

 

                    CULTURE, URINE, ROUTINE           SEE NOTE            NRG   

     

 

                                        Complete urinalysis with reflex to cultu

re - 20 15:35         

 

                    Urine color determination           YELLOW              NRG 

       

 

                    Urine clarity determination           CLEAR               NR

G        

 

                    Urine pH measurement by test strip           6.0            

     5-9        

 

                    Specific gravity of urine by test strip           1.020     

          1.016-1.022  

      

 

                          Urine protein assay by test strip, semi-quantitative  

         NEGATIVE         

                                        NEGATIVE        

 

                    Urine glucose detection by automated test strip           NE

GATIVE            

NEGATIVE        

 

                          Erythrocytes detection in urine sediment by light micr

oscopy           NEGATIVE 

                                        NEGATIVE        

 

                    Urine ketones detection by automated test strip           NE

GATIVE            

NEGATIVE        

 

                    Urine nitrite detection by test strip           NEGATIVE    

        NEGATIVE    

    

 

                    Urine total bilirubin detection by test strip           NEGA

TIVE            

NEGATIVE        

 

                          Urine urobilinogen measurement by automated test strip

 (mass/volume)           

0.2 mg/dL                               < = 1.0        

 

                    Urine leukocyte esterase detection by dipstick           TRA

CE               

NEGATIVE        

 

                                        Automated urine sediment erythrocyte cou

nt by microscopy (number/high power 

field)                    RARE                      NRG        

 

                                        Automated urine sediment leukocyte count

 by microscopy (number/high power field)

                           [HPF]                    NRG        

 

                          Bacteria detection in urine sediment by light microsco

py           FEW          

                                        NRG        

 

                                        Squamous epithelial cells detection in u

rine sediment by light microscopy       

                          2-5                       NRG        

 

                          Crystals detection in urine sediment by light microsco

py           NONE         

                                        NRG        

 

                    Casts detection in urine sediment by light microscopy       

    NONE                

NRG        

 

                          Mucus detection in urine sediment by light microscopy 

          SMALL           

                                        NRG        

 

                    Complete urinalysis with reflex to culture           YES    

             NRG        

 

                                        Bacterial urine culture - 20 15:35

         

 

                    Bacterial urine culture           3 OR MORE            NRG  

      

 

                    COLONY COUNT           >100,000/ML            NRG        

 

                    SUSCEPTIBILITY           GRAM POSITIVE ISOLATES; SUGGESTING 

           NRG      

  

 

                    MRSA SCREEN           PROBABLE COLLECTION CONTAMINATION WITH

            NRG     

   

 

                    RAPID ID            SKIN LEANA. NO SUSCEPTIBILITY PERFORMED.

            NRG      

  

 

                                        Complete blood count (CBC) with automate

d white blood cell (WBC) differential - 

20 15:57         

 

                          Blood leukocytes automated count (number/volume)      

     6.6 10*3/uL          

                                        4.3-11.0        

 

                          Blood erythrocytes automated count (number/volume)    

       4.52 10*6/uL       

                                        4.35-5.85        

 

                    Venous blood hemoglobin measurement (mass/volume)           

13.3 g/dL           

11.5-16.0        

 

                    Blood hematocrit (volume fraction)           39 %           

     35-52        

 

                    Automated erythrocyte mean corpuscular volume           87 [

foz_us]           

80-99        

 

                                        Automated erythrocyte mean corpuscular h

emoglobin (mass per erythrocyte)        

                          29 pg                     25-34        

 

                                        Automated erythrocyte mean corpuscular h

emoglobin concentration measurement 

(mass/volume)             34 g/dL                   32-36        

 

                    Automated erythrocyte distribution width ratio           12.

7 %              10.0-

14.5        

 

                    Automated blood platelet count (count/volume)           315 

10*3/uL           

130-400        

 

                          Automated blood platelet mean volume measurement      

     8.8 [foz_us]         

                                        7.4-10.4        

 

                    Automated blood neutrophils/100 leukocytes           59 %   

             42-75       

 

 

                    Automated blood lymphocytes/100 leukocytes           32 %   

             12-44       

 

 

                    Blood monocytes/100 leukocytes           6 %                

 0-12        

 

                    Automated blood eosinophils/100 leukocytes           3 %    

             0-10        

 

                    Automated blood basophils/100 leukocytes           1 %      

           0-10        

 

                    Blood neutrophils automated count (number/volume)           

3.9 10*3            

1.8-7.8        

 

                    Blood lymphocytes automated count (number/volume)           

2.1 10*3            

1.0-4.0        

 

                    Blood monocytes automated count (number/volume)           0.

4 10*3            

0.0-1.0        

 

                    Automated eosinophil count           0.2 10*3/uL           0

.0-0.3        

 

                    Automated blood basophil count (count/volume)           0.0 

10*3/uL           

0.0-0.1        

 

                                        Comprehensive metabolic panel - 20

 15:57         

 

                          Serum or plasma sodium measurement (moles/volume)     

      135 mmol/L          

                                        135-145        

 

                          Serum or plasma potassium measurement (moles/volume)  

         4.0 mmol/L       

                                        3.6-5.0        

 

                          Serum or plasma chloride measurement (moles/volume)   

        106 mmol/L        

                                                

 

                    Carbon dioxide           19 mmol/L           21-32        

 

                          Serum or plasma anion gap determination (moles/volume)

           10 mmol/L      

                                        5-14        

 

                          Serum or plasma urea nitrogen measurement (mass/volume

)           10 mg/dL      

                                        7-18        

 

                          Serum or plasma creatinine measurement (mass/volume)  

         0.79 mg/dL       

                                        0.60-1.30        

 

                    Serum or plasma urea nitrogen/creatinine mass ratio         

  13                  NRG 

       

 

                                        Serum or plasma creatinine measurement w

ith calculation of estimated glomerular 

filtration rate           >                         NRG        

 

                    Serum or plasma glucose measurement (mass/volume)           

85 mg/dL            

        

 

                    Serum or plasma calcium measurement (mass/volume)           

9.0 mg/dL           

8.5-10.1        

 

                          Serum or plasma total bilirubin measurement (mass/volu

me)           0.5 mg/dL   

                                        0.1-1.0        

 

                                        Serum or plasma alkaline phosphatase aram

surement (enzymatic activity/volume)    

                          88 U/L                            

 

                                        Serum or plasma aspartate aminotransfera

se measurement (enzymatic 

activity/volume)           20 U/L                    5-34        

 

                                        Serum or plasma alanine aminotransferase

 measurement (enzymatic activity/volume)

                          22 U/L                    0-55        

 

                    Serum or plasma protein measurement (mass/volume)           

7.1 g/dL            

6.4-8.2        

 

                    Serum or plasma albumin measurement (mass/volume)           

4.2 g/dL            

3.2-4.5        

 

                    CALCIUM CORRECTED           8.8 mg/dL           8.5-10.1    

    

 

                                        Serum or plasma C reactive protein measu

rement (mass/volume) - 20 15:57   

      

 

                          Serum or plasma C reactive protein measurement (mass/v

olume)           1.33 

mg/dL                                   0.00-0.50        



                                                                  



Encounters

      



                ACCT No.           Visit Date/Time           Discharge          

 Status         

             Pt. Type           Provider           Facility           Loc./Unit 

          

Complaint        

 

                [id number]           10/08/2014 16:23:00           10/08/2014 23:59:

59           [facility]

                Outpatient           ZHANE DAY DO                           

         

                                                 

 

                [id number]           2014 14:23:00           2014 23:59:

59           [facility]

                Outpatient           ZHANE DAY DO                           

         

                                                 

 

                [id number]           2014 13:31:00           2014 23:59:

59           [facility]

                Outpatient           ROXANN CONWAY                  

         

                                                 

 

                [id number]           04/15/2014 15:28:00           04/15/2014 23:59:

59           [facility]

                Outpatient           PINO LEYDAPALMIRA                       

         

                                                 

 

                [id number]           2014 12:10:00           2014 23:59:

59           [facility]

                Outpatient           LEE LEYDAROXANN ANUP                  

         

                                                 

 

                [id number]           2014 11:31:00           2014 23:59:

59           [facility]

                Outpatient           SHAY POLK ZHANE PISANO                           

         

                                                 

 

                [id number]           2013 13:40:00           2013 23:59:

59           [facility]

                    Outpatient           HARINI MASTERS LEYDATERRA CRICKET          

         

                                                             

 

                [id number]           2013 11:24:00           2013 23:59:

59           [facility]

                Outpatient           SHAY POLK ZHANE PISANO                           

         

                                                 

 

             [id number]           2013 13:04:00                               

      Document 

Registration                                                                    

 

             [id number]           2016 07:05:00                         

            

Document Registration                                                           

         

 

             [id number]           2016 08:35:00                         

            

Document Registration                                                           

         

 

             [id number]           2016 07:05:00                         

            

Document Registration                                                           

         

 

                    [id number]           2020 05:31:00           

020 09:49:00        

                DIS             Outpatient           AP ROWE MD           

[facility]           PREOP                     COLONOSCOPY        

 

                    [id number]           2020 15:27:00           

020 18:37:00        

                DIS             Outpatient           LUCERO MACDONALD        

   [facility]           ER                        ABD PAIN, RECTAL BLEEDI

NG        

 

                    [id number]           2017 09:42:00           

017 10:46:00        

                DIS             Outpatient           WILFREDO CHAMPAGNE      

     [facility] 

[facility]           REHAB                     NECK PAIN      

  

 

                    [id number]           2017 16:44:00           

017 20:07:00        

                DIS             Emergency           LUCERO MACDONALD         

  [facility]           ER                        RT SIDED ABD PAIN      

  

 

                    [id number]           2017 14:36:00           

017 23:59:59        

                [facility]             Outpatient           DARON ODONNELL    

       [facility] 

[facility]           RAD                       R10.823 RIGHT L

OWER 

[facility] TENDERNESS        

 

                    [id number]           2017 18:45:00           

017 21:30:00        

                DIS             Emergency           DORIS RIDLEY           [facility]

 [facility]           ER                        HEADACHE        

 

                    [id number]           10/23/2017 11:10:00           10/23/2

017 23:59:59        

                [facility]S             Preadmit           IHSAN WILFREDO ANUP PATELN        

   [facility]           RAD                       POSTCONCUSSIVE SYNDROME

 F07.81     

   

 

                    [id number]           10/23/2017 11:07:00           10/23/2

017 23:59:59        

                CLS             Preadmit           WILFREDO CHAMPAGNE APRN        

   [facility]           RAD                       POSTCONCUSSIVE SYNDROME

 F07.81     

   

 

                    [id number]           2017 10:56:00           

017 15:18:00        

                DIS             Outpatient           AP ROWE MD           

[facility]           ENDO                      ABD PAIN; N/V        

 

                    [id number]           2017 05:43:00           

017 23:59:59        

                CLS             Outpatient           AP ROWE MD           

[facility]           PREOP                     ABD PAIN; N/V        

 

                    [id number]           2017 13:03:00           

017 23:59:59        

                CLS             Outpatient           AP ROWE MD           

[facility]           RAD                       ABD PAIN        

 

                    [id number]           2017 07:47:00            12:25:00        

                DIS             Outpatient           AP ROWE MD           

[facility]           SDC                       DYSKNESIA        

 

                    [id number]           2017 08:28:00           

017 08:50:00        

                DIS             Outpatient           AP ROWE MD           

[facility]           PREOP                     DYSKNESIA        

 

                    [id number]           2016 07:03:00           

016 23:59:59        

                CLS             Outpatient           HUSSEIN ARIZA ARNP   

        [facility] 

[facility]           CARD                      GENERALIZED ABD

 PAIN      

  

 

                    [id number]           2016 13:43:00           

016 23:59:59        

                CLS             Outpatient           HUSSEIN ARIZA ARNP   

        [facility] 

[facility]           RAD                       PELVIC PAIN    

    

 

                    [id number]           2016 09:33:00            23:59:59        

                CLS             Outpatient           HUSSEIN ARIZA ARNP   

        [facility] 

[facility]           RAD                       PELVIC PAIN    

    

 

                    [id number]           2015 12:52:00           

015 23:59:59        

                CLS             Outpatient           HUSSEIN ARIZA   

        [facility] 

[facility]           RAD                       PAIN OF RIGHT T

HUMB        

 

             [id number]           2020 13:30:00                        P

EN           

Preadmit                  AP ROWE MD           [facility]

[place]  

                          ENDO                      RECTAL BLEEDING        

 

             [id number]           2011 06:00:00                         

            

Document Registration                                                           

         

 

             [id number]           10/25/2017 14:09:00                         

            

Document Registration                                                           

         

 

             [id number]           2016 10:05:00                         

            

Document Registration                                                           

         

 

             [id number]           2016 18:05:00                         

            

Document Registration                                                           

         

 

                [id number]           2020 16:25:00           2020 23:59:5

9           [facility]S 

                Outpatient           CRUZITO GRANT                           [facility] 

[facility]                                     

 

             [id number]           2019 17:10:00                              

       Document

 Registration                                                                   

 

 

             [id number]           10/18/2018 09:00:00                              

       Document

 Registration                                                                   

 

 

             [id number]           2018 11:05:00                              

       Document

 Registration                                                                   

 

 

             [id number]           10/24/2017 09:00:00                              

       Document

 Registration

## 2020-07-01 NOTE — PROGRESS NOTE-PRE OPERATIVE
Pre-Operative Progress Note


H&P Reviewed


The H&P was reviewed, patient examined and no changes noted.


Date Seen by Provider:  Jul 1, 2020


Time Seen by Provider:  12:00


Date H&P Reviewed:  Jul 1, 2020


Time H&P Reviewed:  12:00


Pre-Operative Diagnosis:  rectal bleeding, abd pain











AP ROWE MD                 Jul 1, 2020 12:18

## 2020-07-01 NOTE — CONSCIOUS SEDATION/ASA
Conscious Sedation Pre-Proced


Time


12:00





ASA Score


2


For ASA 3 and 4: Consider anesthesia and medical clearance. Also, for patients

with a history of failed moderate sedation consider anesthesia.

















Airway 


 


Lungs 


 


Heart 


 


 ASA score


 


 ASA 1: a normal healthy patient


 


 ASA 2:  a patient with a mild systemic disease (mid diabetes, controlled 

hypertension, obesity 


 


 ASA 3:  a patient with a severe systemic disease that limits activity  (angina,

COPD, prior Myocardial infarction)


 


 ASA 4:  a patient with an incapacitating disease that is a constant threat to 

life (CHF, renal failure)


 


 ASA 5:  a moribund patient not expected to survive 24 hrs.  (ruptured aneurysm)


 


 ASA 6:  a declared brain-dead patient whose organs are being harvested.


 


 For emergent operations, add the letter E after the classification











Mallampati Classification


Grade 2





Sedation Plan


Analgesia, Amnesia, Plan communicated to team members, Discussed options with 

patient/fam, Discussed risks with patient/fam


The patient is an appropriate candidate to undergo the planned procedure, 

sedation, and anesthesia.





The patient immediately re-assessed prior to indication.











AP ROWE MD                 Jul 1, 2020 12:18

## 2020-07-02 NOTE — OPERATIVE REPORT
DATE OF SERVICE:  07/01/2020



ATTENDING PRIMARY CARE CLINICIAN:

[facility].



PREOPERATIVE DIAGNOSES:

Crampy abdominal pain and rectal bleeding.



POSTOPERATIVE DIAGNOSES:

Chronic stage II external and internal hemorrhoids.  There were no active

mucosal inflammatory changes to indicate any active colitis or proctitis.



PROCEDURE:

Colonoscopy with biopsies.



SURGEON:

Ap Rowe MD



ANESTHESIA:

Conscious sedation.



ESTIMATED BLOOD LOSS:

Minimal.



FINDINGS:

Same as postoperative diagnoses.



DISPOSITION:

The patient tolerated the procedure well.



INDICATIONS:

The patient is a 40-year-old female known to us.  We had initially seen her in

2016 for symptomatic biliary dyskinesia and she underwent laparoscopic

cholecystectomy on 01/05/2017.  She was seen back in the office due to crampy

abdominal pain as well as some mucousy stools and on 03/08/2017, she underwent

an EGD as well as a colonoscopy.  Colonoscopy did not show any mucosal

inflammatory changes.  There was a polyp of the splenic flexure, which was

biopsied and found to be benign.  She states that she has had reoccurrence of

crampy abdominal pain, more in the left lower abdominal quadrant and this was

associated with blood per rectum.  She does not report any family history of

inflammatory bowel disease.  She was seen in the Emergency Department, a CT scan

was performed, which did not show any abnormalities.



DESCRIPTION OF PROCEDURE:

The patient was brought to the endoscopy suite, laid in the left lateral

decubitus position.  After adequate IV pain and stated medications and conscious

sedation anesthesia, a digital rectal examination was performed.  Chronic stage

II external and internal hemorrhoids were identified, which were not actively

edematous nor inflamed and no bleeding.  Normal sphincter tone was felt and

there were no palpable masses.



The endoscope was then intubated into the anus and rectum gently insufflated. 

The endoscope was then advanced through the valves of Rodríguez of the rectum with

no polyps or any neoplasms as well as no mucosal inflammatory changes to

indicate any active proctitis.  We then proceeded through the sigmoid colon

where no diverticulosis identified.  The endoscope was then advanced to the

remainder of the descending, transverse and ascending colon to the cecum.  There

were no mucosal inflammatory changes to indicate any active colitis or any

proctitis.  There was also no bleeding sources identified as well.  We then

proceeded with random biopsies of the cecum, ascending colon, descending colon

and the rectum using biopsy forceps with visualization of good hemostasis.



The patient tolerated the procedure well.  We feel that she may have some low

level inflammatory bowel disease and if she were to have recurrent symptoms, we

would medically treat her.  If she continues to have symptoms and does not

respond to medication.  We will then refer her to gastroenterology.





Job ID: [id number]

DocumentID: [id number]

Dictated Date:  07/01/2020 14:32:30

Transcription Date: 07/02/2020 01:18:06

Dictated By: AP ROWE MD

## 2020-07-21 ENCOUNTER — HOSPITAL ENCOUNTER (OUTPATIENT)
Dept: HOSPITAL 75 - RAD | Age: 40
End: 2020-07-21
Attending: OBSTETRICS & GYNECOLOGY
Payer: COMMERCIAL

## 2020-07-21 DIAGNOSIS — N94.6: ICD-10-CM

## 2020-07-21 DIAGNOSIS — D25.9: Primary | ICD-10-CM

## 2020-07-21 DIAGNOSIS — N92.0: ICD-10-CM

## 2020-07-21 PROCEDURE — 76830 TRANSVAGINAL US NON-OB: CPT

## 2020-07-21 PROCEDURE — 76856 US EXAM PELVIC COMPLETE: CPT

## 2020-07-21 NOTE — DIAGNOSTIC IMAGING REPORT
PROCEDURE: US Non-ob pelvis comp/trans.



TECHNIQUE: Multiple realtime grayscale images were obtained of

the pelvis in various projections endovaginally.



Transabdominal imaging was also performed.



INDICATION: Fibroid. Dysmenorrhea. Menorrhagia.



COMPARISON: 11/25/2016. CT from 06/19/2020.



FINDINGS:



The uterus measures 8.1 x 3.7 x 5.0 cm. The endometrium measures

9 mm, which is within normal limits for a premenopausal female.

No uterine masses are seen. There is significant shadowing from

bowel gas, and the ovaries are not seen. No free fluid is seen.



IMPRESSION:

1. Normal-appearing uterus with no uterine masses seen.

2. The ovaries are not seen.



Dictated by: 



  Dictated on workstation # [id number]

## 2021-01-02 ENCOUNTER — HOSPITAL ENCOUNTER (EMERGENCY)
Dept: HOSPITAL 75 - ER | Age: 41
Discharge: HOME | End: 2021-01-02
Payer: COMMERCIAL

## 2021-01-02 VITALS — WEIGHT: 205.91 LBS | HEIGHT: 67.99 IN | BODY MASS INDEX: 31.21 KG/M2

## 2021-01-02 DIAGNOSIS — F41.9: ICD-10-CM

## 2021-01-02 DIAGNOSIS — R10.31: Primary | ICD-10-CM

## 2021-01-02 DIAGNOSIS — I10: ICD-10-CM

## 2021-01-02 DIAGNOSIS — Z88.1: ICD-10-CM

## 2021-01-02 DIAGNOSIS — Z87.891: ICD-10-CM

## 2021-01-02 DIAGNOSIS — F32.9: ICD-10-CM

## 2021-01-02 LAB
ALBUMIN SERPL-MCNC: 4.4 GM/DL (ref 3.2–4.5)
ALP SERPL-CCNC: 103 U/L (ref 40–136)
ALT SERPL-CCNC: 51 U/L (ref 0–55)
APTT PPP: YELLOW S
BACTERIA #/AREA URNS HPF: NEGATIVE /HPF
BASOPHILS # BLD AUTO: 0.1 10^3/UL (ref 0–0.1)
BASOPHILS NFR BLD AUTO: 1 % (ref 0–10)
BILIRUB SERPL-MCNC: 0.2 MG/DL (ref 0.1–1)
BILIRUB UR QL STRIP: NEGATIVE
BUN/CREAT SERPL: 15
CALCIUM SERPL-MCNC: 9.3 MG/DL (ref 8.5–10.1)
CHLORIDE SERPL-SCNC: 106 MMOL/L (ref 98–107)
CO2 SERPL-SCNC: 22 MMOL/L (ref 21–32)
CREAT SERPL-MCNC: 0.75 MG/DL (ref 0.6–1.3)
EOSINOPHIL # BLD AUTO: 0.1 10^3/UL (ref 0–0.3)
EOSINOPHIL NFR BLD AUTO: 2 % (ref 0–10)
FIBRINOGEN PPP-MCNC: CLEAR MG/DL
GFR SERPLBLD BASED ON 1.73 SQ M-ARVRAT: > 60 ML/MIN
GLUCOSE SERPL-MCNC: 94 MG/DL (ref 70–105)
GLUCOSE UR STRIP-MCNC: NEGATIVE MG/DL
HCT VFR BLD CALC: 42 % (ref 35–52)
HGB BLD-MCNC: 13.8 G/DL (ref 11.5–16)
KETONES UR QL STRIP: NEGATIVE
LEUKOCYTE ESTERASE UR QL STRIP: NEGATIVE
LYMPHOCYTES # BLD AUTO: 2 10^3/UL (ref 1–4)
LYMPHOCYTES NFR BLD AUTO: 28 % (ref 12–44)
MANUAL DIFFERENTIAL PERFORMED BLD QL: NO
MCH RBC QN AUTO: 29 PG (ref 25–34)
MCHC RBC AUTO-ENTMCNC: 33 G/DL (ref 32–36)
MCV RBC AUTO: 88 FL (ref 80–99)
MONOCYTES # BLD AUTO: 0.5 10^3/UL (ref 0–1)
MONOCYTES NFR BLD AUTO: 8 % (ref 0–12)
NEUTROPHILS # BLD AUTO: 4.3 10^3/UL (ref 1.8–7.8)
NEUTROPHILS NFR BLD AUTO: 61 % (ref 42–75)
NITRITE UR QL STRIP: NEGATIVE
PH UR STRIP: 7.5 [PH] (ref 5–9)
PLATELET # BLD: 337 10^3/UL (ref 130–400)
PMV BLD AUTO: 8.7 FL (ref 9–12.2)
POTASSIUM SERPL-SCNC: 4.4 MMOL/L (ref 3.6–5)
PROT SERPL-MCNC: 7.5 GM/DL (ref 6.4–8.2)
PROT UR QL STRIP: NEGATIVE
RBC #/AREA URNS HPF: (no result) /HPF
SODIUM SERPL-SCNC: 136 MMOL/L (ref 135–145)
SP GR UR STRIP: <=1.005 (ref 1.02–1.02)
SQUAMOUS #/AREA URNS HPF: (no result) /HPF
WBC # BLD AUTO: 7 10^3/UL (ref 4.3–11)
WBC #/AREA URNS HPF: (no result) /HPF

## 2021-01-02 PROCEDURE — 85025 COMPLETE CBC W/AUTO DIFF WBC: CPT

## 2021-01-02 PROCEDURE — 81000 URINALYSIS NONAUTO W/SCOPE: CPT

## 2021-01-02 PROCEDURE — 80053 COMPREHEN METABOLIC PANEL: CPT

## 2021-01-02 PROCEDURE — 36415 COLL VENOUS BLD VENIPUNCTURE: CPT

## 2021-01-02 PROCEDURE — 74177 CT ABD & PELVIS W/CONTRAST: CPT

## 2021-01-02 PROCEDURE — 84703 CHORIONIC GONADOTROPIN ASSAY: CPT

## 2021-01-02 PROCEDURE — 86141 C-REACTIVE PROTEIN HS: CPT

## 2021-01-02 NOTE — DIAGNOSTIC IMAGING REPORT
CT abd/pelv w (appendicitis).



TECHNIQUE: Multiple contiguous axial images were obtained through

the abdomen and pelvis after administration of intravenous

contrast. All CT scans use one or more of the following dose

optimizing techniques: automated exposure control, MA and/or KvP

adjustment based on patient size and exam type or iterative

reconstruction. 



INDICATION: Right lower quadrant pain.



COMPARISON: CT abdomen and pelvis of 06/19/2020.



FINDINGS:



Lower chest: The lung bases are clear. No pericardial or pleural

effusion. 



Peritoneum: No free intraperitoneal air or fluid.  



Liver and biliary system: Unchanged hepatomegaly with the liver

measuring 20 cm. Marked hypoattenuation throughout the liver is

also unchanged and most compatible with diffuse hepatic

steatosis. No focal hepatic lesion in the portal vein is patent.

Cholecystectomy. No pathologic biliary duct dilatation.



Spleen and Pancreas: Spleen is normal. The pancreas enhances

normally without mass lesion or peripancreatic inflammatory

changes. 



Adrenals: Normal.



 tract: The kidneys enhance normally without suspicious mass or

obstruction. Stable round benign cyst in the upper pole of left

kidney that requires no dedicated follow-up imaging. Urinary

bladder is distended without wall thickening. Uterus and ovaries

are unremarkable.



GI tract: Stomach is filled with fluid and there is no wall

thickening. No bowel obstruction. No pericolonic inflammatory

changes. Normal appendix.



Vasculature and Lymph nodes: Normal caliber aorta. No abdominal

or pelvic lymphadenopathy.



Musculoskeletal: No concerning osseous lesion. 



IMPRESSION: 

1. No acute obstructive or inflammatory process. Specifically,

the appendix is normal.

2. Unchanged hepatomegaly with diffuse steatosis.



Dictated by: 



  Dictated on workstation # [id number]

## 2021-01-02 NOTE — ED ABDOMINAL PAIN
General


Stated Complaint:  ABD AND BACK PAIN


Source of Information:  Patient


Exam Limitations:  No Limitations





History of Present Illness


Date Seen by Provider:  Jan 2, 2021


Time Seen by Provider:  18:12


Initial Comments


To ER with right lower quadrant abdominal pain for about 3 days.  She had this 

before about a year ago and it went away without residual effect.  She has had 

nausea but no fevers.  Movement makes the pain worse.


Timing/Duration:  2-3 Days


Severity/Quality:  Moderate


Location:  [facility]


Radiation:  No Radiation


Activities at Onset:  None


Associated Symptoms:  Nausea/Vomiting





Allergies and Home Medications


Allergies


Coded Allergies:  


     amoxicillin (Verified  Allergy, Unknown, HAS TAKEN PENICILLIN RECENTLY W/NO

RXN, 6/10/08)





Home Medications


Amitriptyline HCl 50 Mg Tablet, 50 MG PO HS, (Reported)


Buspirone HCl 10 Mg Tablet, 10 MG PO DAILY, (Reported)


Cetirizine HCl 10 Mg Tablet, 10 MG PO HS, (Reported)


Citalopram Hydrobromide 20 Mg Tablet, 20 MG PO DAILY, (Reported)


Dicyclomine HCl 20 Mg Tablet, 20 MG PO Q6H, (Reported)


Hydroxyzine HCl 25 Mg Tablet, 25 MG PO Q8H PRN for ANXIETY, (Reported)


Lisinopril 10 Mg Tablet, 10 MG PO DAILY, (Reported)


Norgestimate-Ethinyl Estradiol 1 Each Tablet, 1 TAB PO DAILY, (Reported)





Patient Home Medication List


Home Medication List Reviewed:  Yes





Review of Systems


Review of Systems


Constitutional:  see HPI


EENTM:  No Symptoms Reported


Respiratory:  No Symptoms Reported


Cardiovascular:  No Symptoms Reported


Gastrointestinal:  See HPI, Abdominal Pain, Nausea


Genitourinary:  No Symptoms Reported


Musculoskeletal:  no symptoms reported


Skin:  no symptoms reported


Psychiatric/Neurological:  No Symptoms Reported


Endocrine:  No Symptoms Reported


Hematologic/Lymphatic:  No Symptoms Reported





Past Medical-Social-Family Hx


Patient Social History


Type Used:  Cigarettes


Former Smoker, Quit:  Jan 4, 2008


Recent Foreign Travel:  No


Contact w/Someone Who Travel:  No


Recent Hopitalizations:  No





Immunizations Up To Date


Tetanus Booster (TDap):  Unknown





Seasonal Allergies


Seasonal Allergies:  Yes





Past Medical History


Surgeries:  Yes (WISDOM TEETH)


Gallbladder


Respiratory:  No


Cardiac:  Yes


Hypertension


Neurological:  No


Reproductive Disorders:  No


Genitourinary:  No


Gastrointestinal:  Yes


Gastroesophageal Reflux, Polyps, Hiatal Hernia, Irritable Bowel


Musculoskeletal:  No


Endocrine:  No


HEENT:  No


Loss of Vision:  Bilateral


Hearing Impairment:  Denies


Cancer:  No


Psychosocial:  Yes


Anxiety, Depression


Integumentary:  No


Blood Disorders:  No





Physical Exam


Vital Signs





Vital Signs - First Documented








 1/2/21





 17:15


 


Temp 36.0


 


Pulse 104


 


Resp 19


 


B/P (MAP) 125/91 (102)


 


Pulse Ox 97


 


O2 Delivery Room Air





Capillary Refill :


Height/Weight/BMI


Height: 5'8.00"


Weight: 165lbs. 9.0oz. [id number]; 30.71 BMI


Method:Stated


General Appearance:  WD/WN, no apparent distress


Respiratory:  no respiratory distress, no accessory muscle use


Cardiovascular:  regular rate, rhythm, no murmur


Gastrointestinal:  normal bowel sounds, soft, tenderness


Extremities:  normal range of motion, non-tender


Neurologic/Psychiatric:  alert, normal mood/affect, oriented x 3


Skin:  normal color, warm/dry





Progress/Results/Core Measures


Results/Orders


Lab Results





Laboratory Tests








Test


 1/2/21


18:10 Range/Units


 


 


White Blood Count


 7.0 


 4.3-11.0


10^3/uL


 


Red Blood Count


 4.74 


 3.80-5.11


10^6/uL


 


Hemoglobin 13.8  11.5-16.0  g/dL


 


Hematocrit 42  35-52  %


 


Mean Corpuscular Volume 88  80-99  fL


 


Mean Corpuscular Hemoglobin 29  25-34  pg


 


Mean Corpuscular Hemoglobin


Concent 33 


 32-36  g/dL





 


Red Cell Distribution Width 12.2  10.0-14.5  %


 


Platelet Count


 337 


 130-400


10^3/uL


 


Mean Platelet Volume 8.7 L 9.0-12.2  fL


 


Immature Granulocyte % (Auto) 0   %


 


Neutrophils (%) (Auto) 61  42-75  %


 


Lymphocytes (%) (Auto) 28  12-44  %


 


Monocytes (%) (Auto) 8  0-12  %


 


Eosinophils (%) (Auto) 2  0-10  %


 


Basophils (%) (Auto) 1  0-10  %


 


Neutrophils # (Auto)


 4.3 


 1.8-7.8


10^3/uL


 


Lymphocytes # (Auto)


 2.0 


 1.0-4.0


10^3/uL


 


Monocytes # (Auto)


 0.5 


 0.0-1.0


10^3/uL


 


Eosinophils # (Auto)


 0.1 


 0.0-0.3


10^3/uL


 


Basophils # (Auto)


 0.1 


 0.0-0.1


10^3/uL


 


Immature Granulocyte # (Auto)


 0.0 


 0.0-0.1


10^3/uL


 


Sodium Level 136  135-145  MMOL/L


 


Potassium Level 4.4  3.6-5.0  MMOL/L


 


Chloride Level 106    MMOL/L


 


Carbon Dioxide Level 22  21-32  MMOL/L


 


Anion Gap 8  5-14  MMOL/L


 


Blood Urea Nitrogen 11  7-18  MG/DL


 


Creatinine


 0.75 


 0.60-1.30


MG/DL


 


Estimat Glomerular Filtration


Rate > 60 


  





 


BUN/Creatinine Ratio 15   


 


Glucose Level 94    MG/DL


 


Calcium Level 9.3  8.5-10.1  MG/DL


 


Corrected Calcium 9.0  8.5-10.1  MG/DL


 


Total Bilirubin 0.2  0.1-1.0  MG/DL


 


Aspartate Amino Transf


(AST/SGOT) 42 H


 5-34  U/L





 


Alanine Aminotransferase


(ALT/SGPT) 51 


 0-55  U/L





 


Alkaline Phosphatase 103    U/L


 


C-Reactive Protein High


Sensitivity 0.96 H


 0.00-0.50


MG/DL


 


Total Protein 7.5  6.4-8.2  GM/DL


 


Albumin 4.4  3.2-4.5  GM/DL


 


Serum Pregnancy Test,


Qualitative NEGATIVE 


 NEGATIVE  











My Orders





Orders - LUCERO MACDONALD APRN


Ct Abd/Pelv W (Appendicitis) (1/2/21 17:52)


Cbc With Automated Diff (1/2/21 17:52)


Hs C Reactive Protein (1/2/21 17:52)


Comprehensive Metabolic Panel (1/2/21 17:52)


Ua Culture If Indicated (1/2/21 17:52)


Ed Iv/Invasive Line Start (1/2/21 17:52)


Hcg,Qualitative Serum (1/2/21 17:52)


Ondansetron Injection (Zofran Injectio (1/2/21 18:30)


Ketorolac Injection (Toradol Injection) (1/2/21 18:30)


Fentanyl  Injection (Sublimaze Injection (1/2/21 18:30)


Iohexol Injection (Omnipaque 350 Mg/Ml 1 (1/2/21 18:45)


Received Contrast (Hold Metformin- Contr (1/2/21 18:45)


Ns (Ivpb) (Sodium Chloride 0.9% Ivpb Bag (1/2/21 18:45)





Medications Given in ED





Current Medications








 Medications  Dose


 Ordered  Sig/Randal


 Route  Start Time


 Stop Time Status Last Admin


Dose Admin


 


 Fentanyl Citrate  50 mcg  ONCE  ONCE


 IVP  1/2/21 18:30


 1/2/21 18:31 DC 1/2/21 19:03


50 MCG


 


 Iohexol  100 ml  ONCE  ONCE


 IV  1/2/21 18:45


 1/2/21 18:46 DC 1/2/21 18:54


100 ML


 


 Ketorolac


 Tromethamine  15 mg  ONCE  ONCE


 IVP  1/2/21 18:30


 1/2/21 18:31 DC 1/2/21 19:00


15 MG


 


 Ondansetron HCl  8 mg  ONCE  ONCE


 IVP  1/2/21 18:30


 1/2/21 18:31 DC 1/2/21 19:00


8 MG


 


 Sodium Chloride  100 ml  ONCE  ONCE


 IV  1/2/21 18:45


 1/2/21 18:46 DC 1/2/21 18:54


80 ML








Vital Signs/I&O











 1/2/21





 17:15


 


Temp 36.0


 


Pulse 104


 


Resp 19


 


B/P (MAP) 125/91 (102)


 


Pulse Ox 97


 


O2 Delivery Room Air











Departure


Impression





   Primary Impression:  


   Abdominal pain


Disposition:  01 HOME, SELF-CARE


Condition:  Critical





Departure-Patient Inst.


Decision time for Depature:  19:14


Referrals:  


[facility]ILE[facility] (PCP)


Primary Care Physician








ALLYSON CONLEY (Family)


Primary Care Physician


Patient Instructions:  Abdominal Pain, Adult ED





Add. Discharge Instructions:  


1. Return to ER for any concerns. Follow up with one of the surgeons listed. 

Call monday for an appointment.


Scripts


Dicyclomine HCl (Dicyclomine HCl) 10 Mg Capsule


10 MG PO TID, #21 CAP


   Prov: LUCERO MACDONALD APRCRICKET         1/2/21











LUCERO MACDONALD APRN              Jan 2, 2021 18:12

## 2021-02-19 ENCOUNTER — HOSPITAL ENCOUNTER (OUTPATIENT)
Dept: HOSPITAL 75 - RAD | Age: 41
End: 2021-02-19
Attending: NURSE PRACTITIONER
Payer: COMMERCIAL

## 2021-02-19 DIAGNOSIS — K76.0: ICD-10-CM

## 2021-02-19 DIAGNOSIS — N83.202: Primary | ICD-10-CM

## 2021-02-19 PROCEDURE — 74177 CT ABD & PELVIS W/CONTRAST: CPT

## 2021-02-19 NOTE — DIAGNOSTIC IMAGING REPORT
EXAMINATION: CT Abdomen and Pelvis with intravenous contrast.



TECHNIQUE: Multiple contiguous axial images were obtained through

the abdomen and pelvis after the uneventful administration of

intravenous contrast. All CT scans use one or more of the

following dose optimizing techniques: automated exposure control,

MA and/or KvP adjustment based on a patient size and exam type,

or iterative reconstruction. 



HISTORY: Right lower quadrant pain.



COMPARISON: CT abdomen and pelvis from 01/02/2021.



FINDINGS: 



Lung bases: The lung bases are clear.



Solid organs: Diffuse hypoattenuation of the liver, compatible

with hepatic steatosis. The gallbladder is surgically absent.

There is no biliary ductal dilation. Pancreas is normal. Spleen

is normal. Adrenal glands are normal. A left renal cyst is

present and requires no follow-up. Kidneys are otherwise

unremarkable without hydronephrosis.



Bowel: The stomach and small bowel are normal without

obstruction. The colon and appendix are normal. 



Peritoneum: There is no intraperitoneal free fluid or free air.

No suspicious lymphadenopathy. 



Vasculature: Normal without aneurysm.



Musculoskeletal: No suspicious osseous lesion or compression

fracture.



Pelvis: The uterus and adnexa are normal. The urinary bladder is

normal.



IMPRESSION:



1. No acute abnormality in the abdomen or pelvis.

2. Hepatic steatosis.



Dictated by: 



  Dictated on workstation # [id number]

## 2021-05-21 ENCOUNTER — HOSPITAL ENCOUNTER (EMERGENCY)
Dept: HOSPITAL 75 - ER | Age: 41
Discharge: HOME | End: 2021-05-21
Payer: COMMERCIAL

## 2021-05-21 VITALS — BODY MASS INDEX: 31.1 KG/M2 | WEIGHT: 202.83 LBS | HEIGHT: 67.72 IN

## 2021-05-21 VITALS — SYSTOLIC BLOOD PRESSURE: 126 MMHG | DIASTOLIC BLOOD PRESSURE: 97 MMHG

## 2021-05-21 DIAGNOSIS — K58.9: ICD-10-CM

## 2021-05-21 DIAGNOSIS — S63.501A: Primary | ICD-10-CM

## 2021-05-21 DIAGNOSIS — S50.11XA: ICD-10-CM

## 2021-05-21 DIAGNOSIS — I10: ICD-10-CM

## 2021-05-21 DIAGNOSIS — Z79.899: ICD-10-CM

## 2021-05-21 DIAGNOSIS — F32.9: ICD-10-CM
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
## 2021-05-21 DIAGNOSIS — F41.9: ICD-10-CM
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
## 2021-05-21 DIAGNOSIS — Z87.891: ICD-10-CM

## 2021-05-21 PROCEDURE — 99282 EMERGENCY DEPT VISIT SF MDM: CPT

## 2021-05-21 PROCEDURE — 73090 X-RAY EXAM OF FOREARM: CPT

## 2021-05-21 NOTE — ED UPPER EXTREMITY
General


Chief Complaint:  Upper Extremity


Stated Complaint:  R HAND PAIN


Nursing Triage Note:  


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 


landed on her right arm. She was seen at [facility] at 1000 and they advised her that 


she had a sprain and wrapped her arm. She advises that the pain is a 9/10 and 


that the pain is all the way into her shoulder.


Nursing Sepsis Screen:  No Definite Risk


Source:  patient


Exam Limitations:  no limitations





History of Present Illness


Date Seen by Provider:  May 21, 2021


Time Seen by Provider:  16:25


Initial Comments


Here with report of right forearm pain.  States that she was rollerskating with 

her students today and fell backwards onto outstretched arm.  Complains of pain 

to the right forearm.  She was seen at [facility] and they did 

an x-ray.  They did not think that there was anything broken but patient states 

pain is markedly increasing and she feels crunching in her arm.  She states that

they were unsure about the x-rays and presents here for further evaluation.  Den

ies other injury with the fall.  She has taken ibuprofen and Tylenol earlier 

today which is not helped the pain.


Onset:  this morning (10 AM)


Severity:  moderate


Pain/Injury Location:  right forearm


Method of Injury:  fell


Modifying Factors:  Improves With Immobilization; Worse With Movement





Allergies and Home Medications


Allergies


Coded Allergies:  


     amoxicillin (Verified  Allergy, Unknown, HAS TAKEN PENICILLIN RECENTLY W/NO

RXN, 6/10/08)





Home Medications


Amitriptyline HCl 50 Mg Tablet, 50 MG PO HS, (Reported)


Buspirone HCl 10 Mg Tablet, 10 MG PO DAILY, (Reported)


Cetirizine HCl 10 Mg Tablet, 10 MG PO HS, (Reported)


Citalopram Hydrobromide 20 Mg Tablet, 20 MG PO DAILY, (Reported)


Dicyclomine HCl 20 Mg Tablet, 20 MG PO Q6H, (Reported)


Dicyclomine HCl 10 Mg Capsule, 10 MG PO TID


   Prescribed by: LUCERO MACDONALD on 21


Hydroxyzine HCl 25 Mg Tablet, 25 MG PO Q8H PRN for ANXIETY, (Reported)


Lisinopril 10 Mg Tablet, 10 MG PO DAILY, (Reported)


Norgestimate-Ethinyl Estradiol 1 Each Tablet, 1 TAB PO DAILY, (Reported)





Patient Home Medication List


Home Medication List Reviewed:  Yes





Review of Systems


Constitutional:  see HPI; No chills, No fever


Respiratory:  no symptoms reported


Cardiovascular:  no symptoms reported


Musculoskeletal:  see HPI, joint pain, muscle pain


Skin:  No change in color, No lesions





Past Medical-Social-Family Hx


Past Med/Social Hx:  Reviewed Nursing Past Med/Soc Hx


Patient Social History


Alcohol Use:  Denies Use


Smoking Status:  Former Smoker


Type Used:  Cigarettes


Former Smoker, Quit:  2008


Recent Infectious Disease Expo:  No


Recent Hopitalizations:  No





Immunizations Up To Date


Tetanus Booster (TDap):  Unknown





Seasonal Allergies


Seasonal Allergies:  Yes





Past Medical History


Surgeries:  Yes (WISDOM TEETH)


Gallbladder


Respiratory:  No


Cardiac:  Yes


Hypertension


Neurological:  No


Reproductive Disorders:  No


Genitourinary:  No


Gastrointestinal:  Yes


Gastroesophageal Reflux, Polyps, Hiatal Hernia, Irritable Bowel


Musculoskeletal:  No


Endocrine:  No


HEENT:  No


Loss of Vision:  Bilateral


Hearing Impairment:  Denies


Cancer:  No


Psychosocial:  Yes


Anxiety, Depression


Integumentary:  No


Blood Disorders:  No





Family Medical History


Reviewed Nursing Family Hx





Physical Exam


Vital Signs





Vital Signs - First Documented








 21





 15:59


 


Temp 36.8


 


Pulse 93


 


Resp 16


 


B/P (MAP) 126/97 (107)


 


Pulse Ox 96


 


O2 Delivery Room Air





Capillary Refill : Less Than 3 Seconds


Height, Weight, BMI


Height: 5'8.00"


Weight: 165lbs. 9.0oz. 74.913184qk; 31.00 BMI


Method:Stated


General Appearance:  WD/WN, mild distress


Cardiovascular:  regular rate, rhythm, no murmur


Respiratory:  lungs clear, normal breath sounds


Elbow/Forearm:  Right, bone tenderness, limited ROM, pain, soft tissue 

tenderness


Wrist:  No deformity; Yes limited ROM (Right side)


Hand:  no evidence of injury, normal ROM, Right


Neurologic/Psychiatric:  no motor/sensory deficits, alert, oriented x 3


Skin:  normal color, warm/dry





Progress/Results/Core Measures


Results/Orders


My Orders





Orders - ALLI GOOD MD


Forearm, Right, 2 Views (21 16:22)


Hydrocodone/Apap 5/325 Tablet (Lortab 5 (21 16:30)





Medications Given in ED





Current Medications








 Medications  Dose


 Ordered  Sig/Randal


 Route  Start Time


 Stop Time Status Last Admin


Dose Admin


 


 Acetaminophen/


 Hydrocodone Bitart  1 ea  ONCE  ONCE


 PO  21 16:30


 21 16:31 DC 21 16:28


1 EA








Vital Signs/I&O











 21





 15:59


 


Temp 36.8


 


Pulse 93


 


Resp 16


 


B/P (MAP) 126/97 (107)


 


Pulse Ox 96


 


O2 Delivery Room Air














Blood Pressure Mean:                    107











Progress


Progress Note :  


Progress Note


Seen and evaluated.  We will go ahead and repeat x-ray for better clarification 

of injury.  Hydrocodone 5/325 1 tab p.o. given.  Ice pack given.  Monitor 

patient.  1713: X-ray negative.  Replaced splint and Ace wrap.  We will give a 

sling for comfort.  Discharged home with return precautions.  Patient verbalized

understanding instructions and agreement with plan.





Diagnostic Imaging





   Diagonstic Imaging:  Xray


   Plain Films/CT/US/NM/MRI:  forearm


Comments


                 [facility] [place], [place]





NAME:   TABBY BETHEA


MED REC#:   [id number]


ACCOUNT#:   [id number]


PT STATUS:   REG ER


:   1980


PHYSICIAN:   ALLI GOOD MD


ADMIT DATE:   21/ER


                                   ***Draft***


Date of Exam:21





FOREARM, RIGHT, 2 VIEWS








INDICATION: Forearm pain.





COMPARISON: 2015





TECHNIQUE: Two radiographs of the right forearm dated 2021.





FINDINGS: No acute fracture or dislocation. No destructive


osseous process. No suspicious radiopaque foreign body.





IMPRESSION: Unremarkable examination without acute osseous


abnormality.





  Dictated on workstation # [id number]








Dict:   21 1640


Trans:   21 1643


[facility] [id number]





Interpreted by:     JOSELUIS STANLEY MD


Electronically signed by:





Departure


Impression





   Primary Impression:  


   Contusion of right forearm


   Qualified Codes:  S50.11XA - Contusion of right forearm, initial encounter


   Additional Impression:  


   Sprain of right forearm


   Qualified Codes:  S63.501A - Unspecified sprain of right wrist, initial 

   encounter


Disposition:   HOME, SELF-CARE


Condition:  Stable





Departure-Patient Inst.


Decision time for Depature:  17:14


Referrals:  


[facility]/GALILE[facility] (PCP)


Primary Care Physician








ALLYSON CONLEY (Family)


Primary Care Physician


Patient Instructions:  Sprain (DC), Minor Contusion ED





Add. Discharge Instructions:  








All discharge instructions reviewed with patient and/or family. Voiced 

understanding.





Take medications as directed.  You may take ibuprofen 600 mg every 8 hours as 

needed for pain.  You may take Tylenol/acetaminophen 1000 mg every 8 hours if 

needed for pain if you are not taking the prescribed pain medicine.  Do not take

both at the same time as they both have acetaminophen in them.  Follow-up with 

your doctor next week for recheck and further evaluation if not improved.  You 

may remove splint to shower but otherwise continue to use splint and sling for 

the next 5 to 7 days as needed.  Return for worse pain, swelling, weakness, 

numbness or other concerns as needed.


Scripts


Hydrocodone Bit/Acetaminophen (HYDROcodone/APAP  5 MG/325 MG TAB) 1 Tab Tab


1 TAB PO Q6H for Pain, #8 TAB 0 Refills


   Prov: ALLI GOOD MD         21











ALLI GOOD MD          May 21, 2021 16:44

## 2021-05-21 NOTE — DIAGNOSTIC IMAGING REPORT
INDICATION: Forearm pain.



COMPARISON: 08/08/2015



TECHNIQUE: Two radiographs of the right forearm dated 05/21/2021.



FINDINGS: No acute fracture or dislocation. No destructive

osseous process. No suspicious radiopaque foreign body.



IMPRESSION: Unremarkable examination without acute osseous

abnormality.



Dictated by: 



  Dictated on workstation # [id number]

## 2021-11-15 ENCOUNTER — HOSPITAL ENCOUNTER (EMERGENCY)
Dept: HOSPITAL 75 - ER | Age: 41
Discharge: HOME | End: 2021-11-15
Payer: COMMERCIAL

## 2021-11-15 VITALS — SYSTOLIC BLOOD PRESSURE: 143 MMHG | DIASTOLIC BLOOD PRESSURE: 75 MMHG

## 2021-11-15 VITALS — WEIGHT: 209 LBS | HEIGHT: 67.99 IN | BODY MASS INDEX: 31.67 KG/M2

## 2021-11-15 DIAGNOSIS — F41.9: ICD-10-CM

## 2021-11-15 DIAGNOSIS — Z79.899: ICD-10-CM

## 2021-11-15 DIAGNOSIS — J10.1: Primary | ICD-10-CM

## 2021-11-15 DIAGNOSIS — I10: ICD-10-CM

## 2021-11-15 DIAGNOSIS — F32.9: ICD-10-CM

## 2021-11-15 LAB
ALBUMIN SERPL-MCNC: 4.1 GM/DL (ref 3.2–4.5)
ALP SERPL-CCNC: 114 U/L (ref 40–136)
ALT SERPL-CCNC: 35 U/L (ref 0–55)
APTT PPP: YELLOW S
BACTERIA #/AREA URNS HPF: (no result) /HPF
BASOPHILS # BLD AUTO: 0.1 10^3/UL (ref 0–0.1)
BASOPHILS NFR BLD AUTO: 1 % (ref 0–10)
BILIRUB SERPL-MCNC: 0.3 MG/DL (ref 0.1–1)
BILIRUB UR QL STRIP: NEGATIVE
BUN/CREAT SERPL: 14
CALCIUM SERPL-MCNC: 8.9 MG/DL (ref 8.5–10.1)
CHLORIDE SERPL-SCNC: 104 MMOL/L (ref 98–107)
CO2 SERPL-SCNC: 22 MMOL/L (ref 21–32)
CREAT SERPL-MCNC: 0.74 MG/DL (ref 0.6–1.3)
EOSINOPHIL # BLD AUTO: 0.3 10^3/UL (ref 0–0.3)
EOSINOPHIL NFR BLD AUTO: 4 % (ref 0–10)
FIBRINOGEN PPP-MCNC: CLEAR MG/DL
GFR SERPLBLD BASED ON 1.73 SQ M-ARVRAT: 87 ML/MIN
GLUCOSE SERPL-MCNC: 86 MG/DL (ref 70–105)
GLUCOSE UR STRIP-MCNC: NEGATIVE MG/DL
HCT VFR BLD CALC: 39 % (ref 35–52)
HGB BLD-MCNC: 13.1 G/DL (ref 11.5–16)
KETONES UR QL STRIP: NEGATIVE
LEUKOCYTE ESTERASE UR QL STRIP: (no result)
LYMPHOCYTES # BLD AUTO: 2.7 10^3/UL (ref 1–4)
LYMPHOCYTES NFR BLD AUTO: 37 % (ref 12–44)
MANUAL DIFFERENTIAL PERFORMED BLD QL: NO
MCH RBC QN AUTO: 30 PG (ref 25–34)
MCHC RBC AUTO-ENTMCNC: 34 G/DL (ref 32–36)
MCV RBC AUTO: 88 FL (ref 80–99)
MONOCYTES # BLD AUTO: 0.6 10^3/UL (ref 0–1)
MONOCYTES NFR BLD AUTO: 8 % (ref 0–12)
NEUTROPHILS # BLD AUTO: 3.6 10^3/UL (ref 1.8–7.8)
NEUTROPHILS NFR BLD AUTO: 50 % (ref 42–75)
NITRITE UR QL STRIP: NEGATIVE
PH UR STRIP: 6 [PH] (ref 5–9)
PLATELET # BLD: 354 10^3/UL (ref 130–400)
PMV BLD AUTO: 8.8 FL (ref 9–12.2)
POTASSIUM SERPL-SCNC: 4.1 MMOL/L (ref 3.6–5)
PROT SERPL-MCNC: 6.8 GM/DL (ref 6.4–8.2)
PROT UR QL STRIP: NEGATIVE
RBC #/AREA URNS HPF: (no result) /HPF
SODIUM SERPL-SCNC: 136 MMOL/L (ref 135–145)
SP GR UR STRIP: 1.02 (ref 1.02–1.02)
SQUAMOUS #/AREA URNS HPF: (no result) /HPF
WBC # BLD AUTO: 7.2 10^3/UL (ref 4.3–11)

## 2021-11-15 PROCEDURE — 85025 COMPLETE CBC W/AUTO DIFF WBC: CPT

## 2021-11-15 PROCEDURE — 87804 INFLUENZA ASSAY W/OPTIC: CPT

## 2021-11-15 PROCEDURE — 36415 COLL VENOUS BLD VENIPUNCTURE: CPT

## 2021-11-15 PROCEDURE — 81000 URINALYSIS NONAUTO W/SCOPE: CPT

## 2021-11-15 PROCEDURE — 80053 COMPREHEN METABOLIC PANEL: CPT

## 2021-11-15 NOTE — ED COUGH/URI
General


Chief Complaint:  Cough/Cold/Flu Symptoms


Stated Complaint:  NEG COVID TEST/HEADACHE/VOMITING/CONGESTION


Nursing Triage Note:  


PT AMB TO RM 8 W REPORTS OF SORE THROAT, RUNNY NOSE, HA, AND NAUSEA SX SATURDAY.




NEGATIVE COVID TEST ON SATURDAY AND TODAY. A&OX4. 


 (DORIS RIDLEY)





History of Present Illness


Date Seen by Provider:  Nov 15, 2021


Time Seen by Provider:  20:25


Initial Comments


41-year-old  female reports respiratory congestion, sore throat, nausea

and vomiting since 11/13/2021.  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  She had a test by PCR on 11/13/2021 that was 

negative and had a rapid test done today at [facility] that was negative.  She was 

prescribed Sudafed and Zofran at [facility], she took these medications and was doing 

better.  She ate dinner tonight and then vomited.  She is continuing to have a 

headache as well.  She does have a history of migraines.  She has been taking 

Tylenol intermittently with no improvement with her headache. She received the 

COVID vaccine and booster, she has not received the Flu Vaccine.


Timing/Duration:  intermittent


Severity/Quality:  moderate


Prior Episodes/Possible Cause:  no prior episodes


Associated Symptoms:  cough, facial pain, headache, nasal congestion


 (DORIS RIDLEY)





Allergies and Home Medications


Allergies


Coded Allergies:  


     amoxicillin (Verified  Allergy, Unknown, HAS TAKEN PENICILLIN RECENTLY W/NO

RXN, 6/10/08)





Patient Home Medication List


Home Medication List Reviewed:  Yes


 (DORIS RIDLEY)


Amitriptyline HCl (Amitriptyline HCl) 50 Mg Tablet, 50 MG PO HS, (Reported)


   Entered as Reported by: SOCRATES ERICKSON on 6/26/20 1429


Buspirone HCl (Buspirone HCl) 10 Mg Tablet, 10 MG PO DAILY, (Reported)


   Entered as Reported by: SOCRATES ERICKSON on 6/26/20 1429


Cetirizine HCl (Cetirizine HCl) 10 Mg Tablet, 10 MG PO HS, (Reported)


   Entered as Reported by: SOCRATES ERICKSON on 1/4/17 0916


Citalopram Hydrobromide (Citalopram HBr) 20 Mg Tablet, 20 MG PO DAILY, 

(Reported)


   Entered as Reported by: SOCRATES ERICKSON on 1/4/17 0916


Dicyclomine HCl (Dicyclomine HCl) 20 Mg Tablet, 20 MG PO Q6H, (Reported)


   Entered as Reported by: SOCRATES ERICKSON on 6/26/20 1429


Dicyclomine HCl (Dicyclomine HCl) 10 Mg Capsule, 10 MG PO TID


   Prescribed by: LUCERO MACDONALD on 1/2/21 1916


Hydrocodone Bit/Acetaminophen (HYDROcodone/APAP  5 MG/325 MG TAB) 1 Tab Tab, 1 

TAB PO Q6H


   Prescribed by: ALLI GOOD on 5/21/21 1717


Hydroxyzine HCl (Hydroxyzine HCl) 25 Mg Tablet, 25 MG PO Q8H PRN for ANXIETY, 

(Reported)


   Entered as Reported by: SOCRATES ERICKSON on 6/26/20 1429


Lisinopril (Lisinopril) 10 Mg Tablet, 10 MG PO DAILY, (Reported)


   Entered as Reported by: SOCRATES ERICKSON on 1/4/17 0916


Norgestimate-Ethinyl Estradiol (Estarylla 0.25-0.035 mg Tablet) 1 Each Tablet, 1

TAB PO DAILY, (Reported)


   Entered as Reported by: SOCRATES ERICKSON on 6/26/20 1429





Review of Systems


Review of Systems


Constitutional:  no symptoms reported, see HPI


EENTM:  see HPI, no symptoms reported


Respiratory:  see HPI, cough


Cardiovascular:  no symptoms reported, see HPI


Gastrointestinal:  see HPI, nausea, vomiting


Psychiatric/Neurological:  See HPI, Headache (DORIS RIDLEY)





All Other Systems Reviewed


Negative Unless Noted:  Yes


 (DORIS RIDLEY)





Past Medical-Social-Family Hx


Patient Social History


Tobacco Use?:  No


Use of E-Cig and/or Vaping dev:  No


Substance use?:  No


Alcohol Use?:  No


 (DORIS RIDLEY)





Immunizations Up To Date


Tetanus Booster (TDap):  Unknown


Influenza Vaccine Up-to-Date:  Yes; Up-to-Date


First/Initial COVID19 Vaccinat:  2021


Second COVID19 Vaccination Jaylon:  2021


Third COVID19 Vaccination Date:  2021


COVID19 Vaccine :  DEANDRE


 (DORIS RIDLEY)





Seasonal Allergies


Seasonal Allergies:  Yes


 (DORIS RIDLEY)





Past Medical History


Surgery/Hospitalization HX:  


PMH: MIGRAINES, DEPRESSION, ANXIETY





SX: GALLBLADDER


Surgeries:  Yes (WISDOM TEETH)


Gallbladder


Respiratory:  No


Cardiac:  Yes


Hypertension


Neurological:  No


Reproductive Disorders:  No


Genitourinary:  No


Gastrointestinal:  Yes


Gastroesophageal Reflux, Polyps, Hiatal Hernia, Irritable Bowel


Musculoskeletal:  No


Endocrine:  No


HEENT:  No


Loss of Vision:  Bilateral


Hearing Impairment:  Denies


Cancer:  No


Psychosocial:  Yes


Anxiety, Depression


Integumentary:  No


Blood Disorders:  No


 (DORIS RIDLEY)





Family Medical History


Reviewed Nursing Family Hx


 (KEYANADORIS CONLEY)





Physical Exam





Vital Signs - First Documented








 11/15/21





 20:23


 


Temp 35.9


 


Pulse 66


 


Resp 20


 


B/P (MAP) 162/106 (124)


 


Pulse Ox 99


 


O2 Delivery Room Air








 (CAROEL,SHAILA K DO)


Capillary Refill : Less Than 3 Seconds 


 (KEYANADORIS CONLEY)


Height: 5'8.00"


Weight: 165lbs. 9.0oz. [id number]; 31.00 BMI


Method:Stated


General Appearance:  WD/WN, mild distress


HEENT:  PERRL/EOMI, TMs normal, pharynx normal; No pharyngeal erythema, No 

tonsillar exudate


Neck:  non-tender, full range of motion, supple, normal inspection


Respiratory:  chest non-tender, lungs clear, normal breath sounds


Cardiovascular:  normal peripheral pulses, regular rate, rhythm


Gastrointestinal:  normal bowel sounds, non tender, soft; No distended, No 

guarding, No rebound, No tenderness


Extremities:  normal range of motion, non-tender, normal inspection, normal 

capillary refill


Neurologic/Psychiatric:  no motor/sensory deficits, alert, normal mood/affect, 

oriented x 3


Skin:  normal color, warm/dry; No diaphoresis, No jaundice, No pallor (DORIS RIDLEY)





Progress/Results/Core Measures


Suspected Sepsis


SIRS


Temperature: 


Pulse: 66 


Respiratory Rate: 20


 


Laboratory Tests


11/15/21 21:00: White Blood Count 7.2


Blood Pressure 162 /106 


Mean: 124


 





Laboratory Tests


11/15/21 21:00: 


Creatinine 0.74, Platelet Count 354, Total Bilirubin 0.3


 (DORIS RIDLEY)





Results/Orders


Lab Results





Laboratory Tests








Test


 11/15/21


20:34 11/15/21


21:00 11/15/21


21:46 Range/Units


 


 


Influenza Type A Antigen POSITIVE H   NEGATIVE  


 


Influenza Type B Antigen NEGATIVE    NEGATIVE  


 


White Blood Count


 


 7.2 


 


 4.3-11.0


10^3/uL


 


Red Blood Count


 


 4.40 


 


 3.80-5.11


10^6/uL


 


Hemoglobin  13.1   11.5-16.0  g/dL


 


Hematocrit  39   35-52  %


 


Mean Corpuscular Volume  88   80-99  fL


 


Mean Corpuscular Hemoglobin  30   25-34  pg


 


Mean Corpuscular Hemoglobin


Concent 


 34 


 


 32-36  g/dL





 


Red Cell Distribution Width  11.9   10.0-14.5  %


 


Platelet Count


 


 354 


 


 130-400


10^3/uL


 


Mean Platelet Volume  8.8 L  9.0-12.2  fL


 


Immature Granulocyte % (Auto)  0    %


 


Neutrophils (%) (Auto)  50   42-75  %


 


Lymphocytes (%) (Auto)  37   12-44  %


 


Monocytes (%) (Auto)  8   0-12  %


 


Eosinophils (%) (Auto)  4   0-10  %


 


Basophils (%) (Auto)  1   0-10  %


 


Neutrophils # (Auto)


 


 3.6 


 


 1.8-7.8


10^3/uL


 


Lymphocytes # (Auto)


 


 2.7 


 


 1.0-4.0


10^3/uL


 


Monocytes # (Auto)


 


 0.6 


 


 0.0-1.0


10^3/uL


 


Eosinophils # (Auto)


 


 0.3 


 


 0.0-0.3


10^3/uL


 


Basophils # (Auto)


 


 0.1 


 


 0.0-0.1


10^3/uL


 


Immature Granulocyte # (Auto)


 


 0.0 


 


 0.0-0.1


10^3/uL


 


Sodium Level  136   135-145  MMOL/L


 


Potassium Level  4.1   3.6-5.0  MMOL/L


 


Chloride Level  104     MMOL/L


 


Carbon Dioxide Level  22   21-32  MMOL/L


 


Anion Gap  10   5-14  MMOL/L


 


Blood Urea Nitrogen  10   7-18  MG/DL


 


Creatinine


 


 0.74 


 


 0.60-1.30


MG/DL


 


Estimat Glomerular Filtration


Rate 


 87 


 


  





 


BUN/Creatinine Ratio  14    


 


Glucose Level  86     MG/DL


 


Calcium Level  8.9   8.5-10.1  MG/DL


 


Corrected Calcium  8.8   8.5-10.1  MG/DL


 


Total Bilirubin  0.3   0.1-1.0  MG/DL


 


Aspartate Amino Transf


(AST/SGOT) 


 24 


 


 5-34  U/L





 


Alanine Aminotransferase


(ALT/SGPT) 


 35 


 


 0-55  U/L





 


Alkaline Phosphatase  114     U/L


 


Total Protein  6.8   6.4-8.2  GM/DL


 


Albumin  4.1   3.2-4.5  GM/DL


 


Urine Color   YELLOW   


 


Urine Clarity   CLEAR   


 


Urine pH   6.0  5-9  


 


Urine Specific Gravity   1.025 H 1.016-1.022  


 


Urine Protein   NEGATIVE  NEGATIVE  


 


Urine Glucose (UA)   NEGATIVE  NEGATIVE  


 


Urine Ketones   NEGATIVE  NEGATIVE  


 


Urine Nitrite   NEGATIVE  NEGATIVE  


 


Urine Bilirubin   NEGATIVE  NEGATIVE  


 


Urine Urobilinogen   0.2  < = 1.0  MG/DL


 


Urine Leukocyte Esterase   2+ H NEGATIVE  


 


Urine RBC (Auto)   TRACE-I H NEGATIVE  


 


Urine RBC   0-2   /HPF


 


Urine WBC   10-25 H  /HPF


 


Urine Squamous Epithelial


Cells 


 


 0-2 


  /HPF





 


Urine Crystals   NONE   /LPF


 


Urine Bacteria   FEW H  /HPF


 


Urine Casts   NONE   /LPF


 


Urine Mucus   NEGATIVE   /LPF


 


Urine Culture Indicated   NO   





 (SHAILA LAM DO)


Vital Signs/I&O











 11/15/21 11/15/21





 20:23 22:14


 


Temp 35.9 


 


Pulse 66 73


 


Resp 20 20


 


B/P (MAP) 162/106 (124) 143/75


 


Pulse Ox 99 98


 


O2 Delivery Room Air Room Air








 (SHAILA LAM DO)


Vital Signs/I&O


Capillary Refill : Less Than 3 Seconds 


 (DORIS RIDLEY)








Blood Pressure Mean:                    124








Progress Note :  


   Time:  20:25


Progress Note


Patient seen and evaluated, will obtain labs, give normal saline 1, Compazine 10

mg IV for nausea, Toradol 30 mg IV for headache, and Benadryl 50 mg.


2110 influenza A positive.  Patient notified. No vomiting, since admission. 


2150 patient reports improvement in symptoms.  Discharge instructions and return

precautions reviewed.


 (DORIS RIDLEY)





Departure


Impression





   Primary Impression:  


   Influenza A


Disposition:  01 HOME, SELF-CARE


Condition:  Improved





Departure-Patient Inst.


Decision time for Depature:  21:50


 (DORIS RIDLEY)


Referrals:  


[facility] (PCP)


Primary Care Physician








ALLYSON CONLEY (Family)


Primary Care Physician


Patient Instructions:  Flu, Adult (DC)





Add. Discharge Instructions:  


Rest, increase fluids, 16 ounces every 2 hours while awake.


Alternate between Tylenol 650 mg and ibuprofen 600 mg every 4 hours headache, 

fever or pain.


You need to quarantine at home until you are fever free for 72 hours without 

medication.


Take Tamiflu, as directed. 


Walk 5-10 min, every 2 hours, while awake. 


You may take Robitussin or other Over the Counter Decongestant. 


Follow-up with your primary care provider if symptoms are not improving or 

worsen.


Return to the emergency department for new, urgent healthcare needs.





All discharge instructions reviewed with patient and/or family. Voiced 

understanding.


Work/School Note:  Work Release Form   Date Seen in the Emergency Department:  

Nov 15, 2021


         Other Restrictions Listed Below:  When fever free and symptoms free for

48 hours, without medication.








ATTENDING PHYSICIAN NOTE: 


I WAS PHYSICALLY PRESENT AS ER PHYSICIAN WHEN THIS PATIENT WAS IN ER, BUT I WAS 

NOT INVOLVED IN ANY DECISION MAKING OR ANY CARE OF THIS PATIENT. 


 (SHAILA LAM DO)





Copy


Copies To 1:   ZHANE DAY AMY ARNP                  Nov 15, 2021 21:08


SHAILA LAM DO                 Nov 17, 2021 06:38
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
